# Patient Record
Sex: MALE | Race: WHITE | NOT HISPANIC OR LATINO | Employment: UNEMPLOYED | URBAN - METROPOLITAN AREA
[De-identification: names, ages, dates, MRNs, and addresses within clinical notes are randomized per-mention and may not be internally consistent; named-entity substitution may affect disease eponyms.]

---

## 2018-08-08 ENCOUNTER — APPOINTMENT (EMERGENCY)
Dept: RADIOLOGY | Facility: HOSPITAL | Age: 68
End: 2018-08-08
Payer: COMMERCIAL

## 2018-08-08 ENCOUNTER — HOSPITAL ENCOUNTER (EMERGENCY)
Facility: HOSPITAL | Age: 68
Discharge: HOME | End: 2018-08-08
Attending: EMERGENCY MEDICINE | Admitting: EMERGENCY MEDICINE
Payer: COMMERCIAL

## 2018-08-08 VITALS
HEIGHT: 71 IN | HEART RATE: 50 BPM | RESPIRATION RATE: 18 BRPM | SYSTOLIC BLOOD PRESSURE: 127 MMHG | OXYGEN SATURATION: 98 % | WEIGHT: 171 LBS | DIASTOLIC BLOOD PRESSURE: 77 MMHG | BODY MASS INDEX: 23.94 KG/M2 | TEMPERATURE: 97.8 F

## 2018-08-08 DIAGNOSIS — N20.0 NEPHROLITHIASIS: Primary | ICD-10-CM

## 2018-08-08 LAB
ANION GAP SERPL CALCULATED.3IONS-SCNC: 6 MMOL/L (ref 4–13)
BACTERIA UR QL AUTO: ABNORMAL /HPF
BILIRUB UR QL STRIP: ABNORMAL
BUN SERPL-MCNC: 25 MG/DL (ref 5–25)
CALCIUM SERPL-MCNC: 8.7 MG/DL (ref 8.3–10.1)
CAOX CRY URNS QL MICRO: ABNORMAL /HPF
CHLORIDE SERPL-SCNC: 112 MMOL/L (ref 100–108)
CLARITY UR: ABNORMAL
CO2 SERPL-SCNC: 24 MMOL/L (ref 21–32)
COLOR UR: YELLOW
CREAT SERPL-MCNC: 1.01 MG/DL (ref 0.6–1.3)
GFR SERPL CREATININE-BSD FRML MDRD: 77 ML/MIN/1.73SQ M
GLUCOSE SERPL-MCNC: 101 MG/DL (ref 65–140)
GLUCOSE UR STRIP-MCNC: NEGATIVE MG/DL
HGB UR QL STRIP.AUTO: ABNORMAL
HOLD SPECIMEN: NORMAL
HOLD SPECIMEN: NORMAL
KETONES UR STRIP-MCNC: NEGATIVE MG/DL
LEUKOCYTE ESTERASE UR QL STRIP: NEGATIVE
MUCOUS THREADS UR QL AUTO: ABNORMAL
NITRITE UR QL STRIP: NEGATIVE
NON-SQ EPI CELLS URNS QL MICRO: ABNORMAL /HPF
PH UR STRIP.AUTO: 5.5 [PH] (ref 4.5–8)
POTASSIUM SERPL-SCNC: 4.1 MMOL/L (ref 3.5–5.3)
PROT UR STRIP-MCNC: ABNORMAL MG/DL
RBC #/AREA URNS AUTO: ABNORMAL /HPF
SODIUM SERPL-SCNC: 142 MMOL/L (ref 136–145)
SP GR UR STRIP.AUTO: >=1.03 (ref 1–1.03)
UROBILINOGEN UR QL STRIP.AUTO: 0.2 E.U./DL
WBC #/AREA URNS AUTO: ABNORMAL /HPF

## 2018-08-08 PROCEDURE — 80048 BASIC METABOLIC PNL TOTAL CA: CPT | Performed by: EMERGENCY MEDICINE

## 2018-08-08 PROCEDURE — 81001 URINALYSIS AUTO W/SCOPE: CPT

## 2018-08-08 PROCEDURE — 99284 EMERGENCY DEPT VISIT MOD MDM: CPT

## 2018-08-08 PROCEDURE — 36415 COLL VENOUS BLD VENIPUNCTURE: CPT | Performed by: EMERGENCY MEDICINE

## 2018-08-08 PROCEDURE — 74176 CT ABD & PELVIS W/O CONTRAST: CPT

## 2018-08-08 RX ORDER — MORPHINE SULFATE 15 MG/1
7.5 TABLET ORAL EVERY 4 HOURS PRN
Qty: 2 TABLET | Refills: 0 | Status: SHIPPED | OUTPATIENT
Start: 2018-08-08

## 2018-08-08 RX ORDER — LISINOPRIL 40 MG/1
40 TABLET ORAL DAILY
COMMUNITY
Start: 2017-12-15

## 2018-08-08 RX ORDER — IBUPROFEN 400 MG/1
400 TABLET ORAL ONCE
Status: COMPLETED | OUTPATIENT
Start: 2018-08-08 | End: 2018-08-08

## 2018-08-08 RX ADMIN — IBUPROFEN 400 MG: 400 TABLET ORAL at 10:17

## 2018-08-08 NOTE — DISCHARGE INSTRUCTIONS
Kidney Stones   WHAT YOU NEED TO KNOW:   What is a kidney stone? Kidney stones form in the urinary system when the water and waste in your urine are out of balance  When this happens, certain types of waste crystals separate from the urine  The crystals build up and form kidney stones  Kidney stones can be made of uric acid, calcium, phosphate, or oxalate crystals  You may have 1 or more kidney stones  What increases my risk for kidney stones? · You do not drink enough liquids (especially water) each day  · You have urinary tract infections often  · You follow a certain type of diet  For example, people who eat a diet high in meat or salt may be at higher risk for kidney stones  People who eat foods high in oxalate may also be at higher risk  Foods that are high in oxalate include nuts, chocolate, coffee, and green leafy vegetables  · You take certain medicines such as diuretics, steroids, and antacids  · A family member has had kidney stones  · You were born with a kidney or bowel disorder, or you have other medical problems such as gout  What are the signs and symptoms of kidney stones? · Pain in the middle of your back that moves across to your side or that may spread to your groin    · Nausea and vomiting    · Urge to urinate often, burning feeling when you urinate, or pink or red urine    · Tenderness in your lower back, side, or stomach  How are kidney stones diagnosed? Your healthcare provider will ask about your health, diet, and lifestyle  He may refer you to a urologist  Peri Back may need tests to find out what type of kidney stones you have  Tests can show the size of your kidney stones and where they are in your urinary system  You may have one or more of the following:  · Urine tests  may show if you have blood in your urine  They may also show high amounts of the substances that form kidney stones, such as uric acid  · Blood tests  show how well your kidneys are working   They may also be used to check the levels of calcium or uric acid in your blood  · A noncontrast helical CT scan  is a type of x-ray that uses a computer to take pictures of your kidneys  Healthcare providers use the pictures to check for kidney stones or other problems  · X-rays  of your kidneys, bladder, and ureters may be done  You may be given a dye before the pictures are taken to help healthcare providers see the pictures better  You may need to have more than one x-ray  Tell the healthcare provider if you have ever had an allergic reaction to contrast dye  · An abdominal ultrasound  uses sound waves to show pictures of your abdomen on a monitor  How are kidney stones treated? · NSAIDs , such as ibuprofen, help decrease swelling, pain, and fever  This medicine is available with or without a doctor's order  NSAIDs can cause stomach bleeding or kidney problems in certain people  If you take blood thinner medicine, always ask your healthcare provider if NSAIDs are safe for you  Always read the medicine label and follow directions  · Prescription medicine  may be given  Ask how to take this medicine safely  · Medicines  to balance your electrolytes may be needed  · A procedure or surgery  to remove the kidney stones may be needed if they do not pass on their own  Your treatment will depend on the size and location of your kidney stones  How can I manage my symptoms? · Drink plenty of liquids  Your healthcare provider may tell you to drink at least 8 to 12 (eight-ounce) cups of liquids each day  This helps flush out the kidney stones when you urinate  Water is the best liquid to drink  · Strain your urine every time you go to the bathroom  Urinate through a strainer or a piece of thin cloth to catch the stones  Take the stones to your healthcare provider so they can be sent to the lab for tests  This will help your healthcare providers plan the best treatment for you             · Eat a variety of healthy foods  Healthy foods include fruits, vegetables, whole-grain breads, low-fat dairy products, beans, and fish  You may need to limit how much sodium (salt) or protein you eat  Ask for information about the best foods for you  · Exercise regularly  Your stones may pass more easily if you stay active  Ask about the best activities for you  When should I seek immediate care? · You have vomiting that is not relieved by medicine  When should I contact my healthcare provider? · You have a fever  · You have trouble passing urine  · You see blood in your urine  · You have severe pain  · You have any questions or concerns about your condition or care  CARE AGREEMENT:   You have the right to help plan your care  Learn about your health condition and how it may be treated  Discuss treatment options with your caregivers to decide what care you want to receive  You always have the right to refuse treatment  The above information is an  only  It is not intended as medical advice for individual conditions or treatments  Talk to your doctor, nurse or pharmacist before following any medical regimen to see if it is safe and effective for you  © 2017 [employer] Information is for End User's use only and may not be sold, redistributed or otherwise used for commercial purposes  All illustrations and images included in CareNotes® are the copyrighted property of A D A M , Inc  or Daren Lucero

## 2018-08-08 NOTE — ED PROVIDER NOTES
History  Chief Complaint   Patient presents with    Abdominal Pain     Right lower abdominal pain for last 2 days as well as difficulty urinating during that time  Patient reports vomiting x1 with nausea this morning     Pt is 71yo M presenting for abdominal pain  He has a history of HTN, BPH, and nephrolithiasis  He reports the pain started about 2-3 days in his right lower quadrant and has been increasing in severity since then  It is an intermittent pain without radiation and no relieving or inciting factors  He has not tried taken any medications for pain at home  Today he had several episodes of vomiting, no blood in vomit  He also complains of feelings of not emptying his bladder completely during this timeframe  He does not admit to hematuria, frequency, urgency, or burning with urination  He denies headache, chest pain, shortness of breath, bulges in his groin area  He denies abdominal pain at this moment, saying disappeared at some point between driving to the ED and being put in a room  Prior to Admission Medications   Prescriptions Last Dose Informant Patient Reported? Taking?   lisinopril (ZESTRIL) 40 mg tablet 8/8/2018 at 630  Yes Yes   Sig: Take 40 mg by mouth daily      Facility-Administered Medications: None       Past Medical History:   Diagnosis Date    Hypertension     Spontaneous pneumothorax     When 22years old       Past Surgical History:   Procedure Laterality Date    LAMINECTOMY         History reviewed  No pertinent family history  I have reviewed and agree with the history as documented  Social History   Substance Use Topics    Smoking status: Former Smoker    Smokeless tobacco: Never Used    Alcohol use Yes        Review of Systems   Constitutional: Negative for appetite change, chills, fatigue and fever  HENT: Negative for congestion, ear pain, rhinorrhea and sore throat  Eyes: Negative for photophobia, discharge and visual disturbance     Respiratory: Negative for cough, chest tightness, shortness of breath and wheezing  Cardiovascular: Negative for chest pain and leg swelling  Gastrointestinal: Positive for abdominal pain, nausea and vomiting  Negative for abdominal distention, blood in stool, constipation and diarrhea  Reported pain in RLQ, does not radiate; pain is disappeared now  Genitourinary: Positive for dysuria  Negative for flank pain, frequency, hematuria and urgency  Musculoskeletal: Negative for arthralgias, back pain, myalgias and neck pain  Skin: Negative for color change, pallor and rash  Neurological: Negative for dizziness, seizures, syncope, weakness, light-headedness, numbness and headaches  Physical Exam  ED Triage Vitals [08/08/18 0854]   Temperature Pulse Respirations Blood Pressure SpO2   97 8 °F (36 6 °C) (!) 48 18 149/81 98 %      Temp Source Heart Rate Source Patient Position - Orthostatic VS BP Location FiO2 (%)   Oral Monitor Sitting Left arm --      Pain Score       4           Orthostatic Vital Signs  Vitals:    08/08/18 0854 08/08/18 1046   BP: 149/81 127/77   Pulse: (!) 48 (!) 50   Patient Position - Orthostatic VS: Sitting Sitting       Physical Exam   Constitutional: He appears well-developed and well-nourished  No distress  HENT:   Head: Normocephalic and atraumatic  Eyes: Conjunctivae and EOM are normal  Right eye exhibits no discharge  Left eye exhibits no discharge  Neck: Normal range of motion  Neck supple  Cardiovascular: Normal rate, regular rhythm, normal heart sounds and intact distal pulses  No murmur heard  Pulmonary/Chest: Effort normal and breath sounds normal  No stridor  No respiratory distress  He has no wheezes  He has no rales  He exhibits no tenderness  Abdominal: Soft  He exhibits no distension  There is no tenderness  There is no rebound and no guarding  No hernia  No pain on physical exam    Musculoskeletal: Normal range of motion   He exhibits no edema, tenderness or deformity  Neurological: He is alert  No cranial nerve deficit or sensory deficit  He exhibits normal muscle tone  Coordination normal    Skin: Skin is warm  No rash noted  He is not diaphoretic  No erythema  No pallor  ED Medications  Medications   ibuprofen (MOTRIN) tablet 400 mg (400 mg Oral Given 8/8/18 1017)       Diagnostic Studies  Results Reviewed     Procedure Component Value Units Date/Time    [place] draw [[id number]] Collected:  08/08/18 0947    Lab Status:  Final result Specimen:  Blood Updated:  08/08/18 1101    Narrative: The following orders were created for panel order [place] draw  Procedure                               Abnormality         Status                     ---------                               -----------         ------                     Gaylin Kenna / Black tube on YRWT[[id number]]                        Final result               Lavender Top 3 ml on SHXW[[id number]]                         Final result                 Please view results for these tests on the individual orders  Basic metabolic panel [[id number]]  (Abnormal) Collected:  08/08/18 0947    Lab Status:  Final result Specimen:  Blood from Arm, Right Updated:  08/08/18 1011     Sodium 142 mmol/L      Potassium 4 1 mmol/L      Chloride 112 (H) mmol/L      CO2 24 mmol/L      Anion Gap 6 mmol/L      BUN 25 mg/dL      Creatinine 1 01 mg/dL      Glucose 101 mg/dL      Calcium 8 7 mg/dL      eGFR 77 ml/min/1 73sq m     Narrative:         National Kidney Disease Education Program recommendations are as follows:  GFR calculation is accurate only with a steady state creatinine  Chronic Kidney disease less than 60 ml/min/1 73 sq  meters  Kidney failure less than 15 ml/min/1 73 sq  meters      Urine Microscopic [[id number]]  (Abnormal) Collected:  08/08/18 0918    Lab Status:  Final result Specimen:  Urine from Urine, Clean Catch Updated:  08/08/18 0952     RBC, UA Innumerable (A) /hpf      WBC, UA None Seen /hpf Epithelial Cells None Seen /hpf      Bacteria, UA None Seen /hpf      Ca Oxalate [place], UA Occasional (A) /hpf      MUCOUS THREADS Moderate (A)    ED Urine Macroscopic [[id number]]  (Abnormal) Collected:  08/08/18 0918    Lab Status:  Final result Specimen:  Urine Updated:  08/08/18 0906     Color, UA Yellow     Clarity, UA Turbid     pH, UA 5 5     Leukocytes, UA Negative     Nitrite, UA Negative     Protein, UA 30 (1+) (A) mg/dl      Glucose, UA Negative mg/dl      Ketones, UA Negative mg/dl      Urobilinogen, UA 0 2 E U /dl      Bilirubin, UA Interference- unable to analyze (A)     Blood, UA Large (A)     Specific Gravity, UA >=1 030    Narrative:       CLINITEK RESULT                 CT renal stone study abdomen pelvis without contrast   Final Result by Virgie Francois MD (08/08 1022)      Right ureterovesical junction calculus, 4 mm in size protruding into the lumen of the urinary bladder but resulting in moderate to severe right-sided hydroureteronephrosis  Prostatomegaly  Workstation performed: [id number]               Procedures  Procedures      Phone Consults  ED Phone Contact    ED Course  ED Course as of Aug 08 1935   Wed Aug 08, 2018   1036 POCUS shows hydronephrosis, no stone identified in bladder  Identification of Seniors at Risk      Most Recent Value   (ISAR) Identification of Seniors at Risk   Before the illness or injury that brought you to the Emergency, did you need someone to help you on a regular basis? 0 Filed at: 08/08/2018 0857   In the last 24 hours, have you needed more help than usual?  0 Filed at: 08/08/2018 7229   Have you been hospitalized for one or more nights during the past 6 months? 0 Filed at: 08/08/2018 0857   In general, do you see well?  0 Filed at: 08/08/2018 0857   In general, do you have serious problems with your memory? 0 Filed at: 08/08/2018 0624   Do you take more than three different medications every day?   1 Filed at: 08/08/2018 0857   ISAR Score  1 Filed at: 08/08/2018 0857                          [facility]  Number of Diagnoses or Management Options  Nephrolithiasis:   Diagnosis management comments: CT revealed 3-4mm stone at UVJ with hydronephrosis  It is felt patient can be discharged home and follow-up with his urologist  He was given a prescription for 2 morphine pills in case of increased severity of pain, but was told to use NSAIDs for pain control  CritCare Time    Disposition  Final diagnoses:   Nephrolithiasis     Time reflects when diagnosis was documented in both MDM as applicable and the Disposition within this note     Time User Action Codes Description Comment    8/8/2018 10:38 AM Handy Gonzalez Add [N20 0] Nephrolithiasis       ED Disposition     ED Disposition Condition Comment    Discharge  [facility] discharge to home/self care  Condition at discharge: Good        Follow-up Information     Follow up With Specialties Details Why Contact Info    Cory Gaytan MD Internal Medicine In 1 week  [address] [facility] [address] [place] [address] [facility] In 1 week  [address] [place] [id number] [phone]          Discharge Medication List as of 8/8/2018 10:47 AM      CONTINUE these medications which have NOT CHANGED    Details   lisinopril (ZESTRIL) 40 mg tablet Take 40 mg by mouth daily, Starting Fri 12/15/2017, Historical Med           No discharge procedures on file  ED Provider  Attending physically available and evaluated [facility]  I managed the patient along with the ED Attending      Electronically Signed by         Alex Joaquin DO  08/08/18 4346

## 2018-08-08 NOTE — ED ATTENDING ATTESTATION
Catracho Pa DO, saw and evaluated the patient  I have discussed the patient with the resident/non-physician practitioner and agree with the resident's/non-physician practitioner's findings, Plan of Care, and MDM as documented in the resident's/non-physician practitioner's note, except where noted  All available labs and Radiology studies were reviewed  At this point I agree with the current assessment done in the Emergency Department  I have conducted an independent evaluation of this patient a history and physical is as follows:    79 yo male presents for evaluation of 2d hx of RLQ abd pain  Started acutely while not doing anything in particular  Progressive worse since onset  At worst pain rated 7-8/10, non radiating  Associated with a couple episodes of NBNB emesis  No a/e factors  Denies testicular pain, bowel sxs  He does note a sensation of incomplete voiding but PVR here is 11mL  Currently states pain 2/10  Hx of HTN, BPH    abd sntnd no r/g bs+ no obvious hernia noted in RLQ  Imp: RLQ pain, likely stone  Plan: UA, BMP, CT stone protocol        Critical Care Time  CritCare Time    Procedures

## 2022-06-02 LAB — HBA1C MFR BLD HPLC: 5.7 %

## 2022-11-30 ENCOUNTER — OFFICE VISIT (OUTPATIENT)
Dept: INTERNAL MEDICINE CLINIC | Facility: CLINIC | Age: 72
End: 2022-11-30

## 2022-11-30 VITALS
RESPIRATION RATE: 16 BRPM | SYSTOLIC BLOOD PRESSURE: 124 MMHG | WEIGHT: 175 LBS | HEART RATE: 60 BPM | HEIGHT: 68 IN | OXYGEN SATURATION: 96 % | DIASTOLIC BLOOD PRESSURE: 70 MMHG | BODY MASS INDEX: 26.52 KG/M2

## 2022-11-30 DIAGNOSIS — N40.0 BENIGN PROSTATIC HYPERPLASIA WITHOUT LOWER URINARY TRACT SYMPTOMS: ICD-10-CM

## 2022-11-30 DIAGNOSIS — M85.80 OSTEOPENIA, UNSPECIFIED LOCATION: ICD-10-CM

## 2022-11-30 DIAGNOSIS — Z11.59 NEED FOR HEPATITIS C SCREENING TEST: Primary | ICD-10-CM

## 2022-11-30 DIAGNOSIS — I10 PRIMARY HYPERTENSION: ICD-10-CM

## 2022-11-30 DIAGNOSIS — H61.23 BILATERAL IMPACTED CERUMEN: ICD-10-CM

## 2022-11-30 DIAGNOSIS — H91.93 DECREASED HEARING OF BOTH EARS: ICD-10-CM

## 2022-11-30 DIAGNOSIS — L98.9 SKIN LESION: ICD-10-CM

## 2022-11-30 DIAGNOSIS — I70.0 ATHEROSCLEROSIS OF ABDOMINAL AORTA (HCC): ICD-10-CM

## 2022-11-30 PROBLEM — R20.0 NUMBNESS OF LEFT FOOT: Status: ACTIVE | Noted: 2022-11-30

## 2022-11-30 RX ORDER — LOSARTAN POTASSIUM 100 MG/1
100 TABLET ORAL DAILY
COMMUNITY
Start: 2022-11-21

## 2022-11-30 NOTE — PATIENT INSTRUCTIONS
Obtain records of previous cologuard, bone density scan, blood work results, shingles vaccine record, previous image report of AAA screen (abdominal aortic aneurysm)

## 2022-11-30 NOTE — PROGRESS NOTES
Name: Brendon Villafuerte      : 1950      MRN: [id number]  Encounter Provider: Claude Sierra MD  Encounter Date: 2022   Encounter department: [facility]    Assessment & Plan     1  Need for hepatitis C screening test    2  Primary hypertension  Assessment & Plan:  Well controlled  Continue losartan      3  Skin lesion  Assessment & Plan:  Refer to derm    Orders:  -     Ambulatory Referral to Dermatology; Future    4  Benign prostatic hyperplasia without lower urinary tract symptoms  Assessment & Plan:  S/p urolift implants 2021  F/u with urology at [facility]      5  Decreased hearing of both ears  Assessment & Plan:  Clear cerumen  Refer to audiology    Orders:  -     Ambulatory Referral to Audiology; Future    6  Atherosclerosis of abdominal aorta (HCC)  Assessment & Plan:  Pt reports had image of chest years ago that showed atherosclerosis  Assume is AAA screen, no records available  Ask pt to send us record to review      7  Bilateral impacted cerumen  Assessment & Plan:  Use debrox  Return as needed for ear flush    Orders:  -     carbamide peroxide (DEBROX) 6 5 % otic solution; Administer 5 drops into both ears 2 (two) times a day    8  Osteopenia, unspecified location  Assessment & Plan:  Pt reports he had bone scan years ago  Ask pt to send records to me for review           Subjective      HPI   Establish care    Skin lesion  hearing decline  Previous pcp in [facility] ophthalmology, urology    Hx HTN, BPH, osteopenia  Moved from [place] about 7y go  Lives with wife and 4yo grandkid  Has a son in [facility] and a daughter in [place]  Review of Systems   Constitutional: Negative for chills, fatigue and fever  HENT: Negative for congestion, nosebleeds, postnasal drip, sneezing, sore throat and trouble swallowing  Eyes: Negative for pain  Respiratory: Negative for cough, chest tightness, shortness of breath and wheezing      Cardiovascular: Negative for chest pain, palpitations and leg swelling  Gastrointestinal: Negative for abdominal pain, constipation, diarrhea, nausea and vomiting  Endocrine: Negative for cold intolerance, heat intolerance, polydipsia and polyuria  Genitourinary: Negative for difficulty urinating, dysuria, flank pain and hematuria  Musculoskeletal: Negative for arthralgias and myalgias  Skin: Negative for rash  Neurological: Negative for dizziness, tremors, weakness and headaches  Hematological: Does not bruise/bleed easily  Psychiatric/Behavioral: Negative for confusion and dysphoric mood  The patient is not nervous/anxious  Current Outpatient Medications on File Prior to Visit   Medication Sig   • losartan (COZAAR) 100 MG tablet Take 100 mg by mouth daily   • [DISCONTINUED] lisinopril (ZESTRIL) 40 mg tablet Take 40 mg by mouth daily (Patient not taking: Reported on 11/30/2022)   • [DISCONTINUED] morphine (MSIR) 15 mg tablet Take 0 5 tablets (7 5 mg total) by mouth every 4 (four) hours as needed for severe pain for up to 4 doses Max Daily Amount: 45 mg (Patient not taking: Reported on 11/30/2022)       Objective     /70   Pulse 60   Resp 16   Ht 5' 8" (1 727 m)   Wt 79 4 kg (175 lb)   SpO2 96%   BMI 26 61 kg/m²     Physical Exam  Constitutional:       General: He is not in acute distress  Appearance: He is well-developed and well-nourished  HENT:      Head: Normocephalic and atraumatic  Right Ear: External ear normal       Left Ear: External ear normal    Eyes:      General: No scleral icterus  Conjunctiva/sclera: Conjunctivae normal    Neck:      Thyroid: No thyromegaly  Trachea: No tracheal deviation  Cardiovascular:      Rate and Rhythm: Normal rate and regular rhythm  Heart sounds: Normal heart sounds  Pulmonary:      Effort: Pulmonary effort is normal  No respiratory distress  Breath sounds: Normal breath sounds  No wheezing or rales  Abdominal:      Tenderness:  There is no abdominal tenderness  There is no guarding or rebound  Musculoskeletal:         General: No edema  Cervical back: Normal range of motion and neck supple  Lymphadenopathy:      Cervical: No cervical adenopathy  Neurological:      Mental Status: He is alert and oriented to person, place, and time  Psychiatric:         Mood and Affect: Mood and affect normal          Behavior: Behavior normal          Thought Content:  Thought content normal          Judgment: Judgment normal        Rah Barreto MD

## 2022-11-30 NOTE — ASSESSMENT & PLAN NOTE
Pt reports had image of chest years ago that showed atherosclerosis  Assume is AAA screen, no records available   Ask pt to send us record to review

## 2022-12-14 ENCOUNTER — TELEPHONE (OUTPATIENT)
Dept: INTERNAL MEDICINE CLINIC | Facility: CLINIC | Age: 72
End: 2022-12-14

## 2022-12-29 ENCOUNTER — OFFICE VISIT (OUTPATIENT)
Dept: AUDIOLOGY | Facility: CLINIC | Age: 72
End: 2022-12-29

## 2022-12-29 DIAGNOSIS — R94.128 ABNORMAL TYMPANOGRAM: ICD-10-CM

## 2022-12-29 DIAGNOSIS — H90.3 SENSORINEURAL HEARING LOSS (SNHL), BILATERAL: Primary | ICD-10-CM

## 2022-12-29 NOTE — PROGRESS NOTES
ADULT HEARING EVALUATION - [place] [address] AUDIOLOGY      Patient Name: Treasure Atwood   MRN:  [id number]   :  1950   Age: 71 y o  Gender: male   DOS: 2022     HISTORY:     Treasure Atwood, a 71 y o  male, was seen on 2022 at the referral of Dr Lindsey López for an audiometric evaluation  Mr Kari Rai was unaccompanied to today's visit  Mr Kari Rai is a new patient to our practice  Today, Mr Opal Coleman primary complaint(s) was difficulty hearing  Onset of symptoms reportedly began gradually over time  He noted that he is missing phone calls and that others are telling him to get his hearing tested  Mr Kari Rai denied otalgia, otorrhea, aural fullness, tinnitus and dizziness  History of otitis was negative  Mr Kari Rai has no history of ear surgeries  Family history of hearing loss was negative  History of noise exposure is negative  Other documented medical history states that Mr Mckenzie  has a past medical history of Hypertension and Spontaneous pneumothorax  Mr Kari Rai has not had his hearing tested previously  RESULTS:    Otoscopic Evaluation:   Right Ear: Non-occluding cerumen, TM view obscured   Left Ear: Non-occluding cerumen, TM view obscured    Tympanometry:   Right Ear: Type B; no measurable middle ear pressure or static compliance, consistent with middle ear pathology  Left Ear: Type C; significant negative middle ear pressure in the presence of normal static compliance, consistent with Eustachian tube dysfunction or middle ear pathology       Audiometry:  Conventional pure tone audiometry from 250 - 8000 Hz  obtained with good reliability and revealed the following:     Right Ear: normal to severe sensorineural hearing loss (SNHL)   Left Ear: normal to profound sensorineural hearing loss (SNHL)     Speech Audiometry:    Speech Reception (SRT)   Right Ear: 20 dB HL   Left Ear: 25 dB HL    Word Recognition Scores (WRS):  Right Ear: excellent (100 % correct)     Left Ear: excellent (100 % correct)   Stimuli: [id number]    IMPRESSIONS:  Bilateral SNHL, normal to severe AD, normal to profound AS  The results of today's findings were reviewed with Mr Camella Holstein and his hearing thresholds were explained at length  Treatment options, including amplification and communication strategies, were discussed as appropriate  Mr Camella Holstein had several questions about OTC, hearing aid consumerism, market share, models  Approximately 25 minutes were spent addressing his questions  At this point Mr Mckenzie is not ready to proceed with hearing aids, and would like to research this further  He is welcome to follow-up with us at any time once he is ready  Mr Camella Holstein voiced understanding of his test results and had no further questions  RECOMMENDATIONS:    1 ) Follow-up with referring provider  2 ) ENT referral recommended for cerumen removal and further workup for abnormal tympanograms  3 ) Based on today's findings and patient symptoms, Mr Camella Holstein is a candidate for a trial with amplification  A hearing aid evaluation to further discuss Mr Hoangs lifestyle, communication needs, and develop a treatment plan for their hearing loss is recommended  *see attached audiogram*    It was a pleasure working with Mr Camella Holstein today  Thank you for referring this patient  Enoch Jacinto    Clinical Audiologist    [address] [address] [id number]

## 2023-01-03 DIAGNOSIS — R94.128 ABNORMAL TYMPANOGRAM: Primary | ICD-10-CM

## 2023-01-29 PROBLEM — H61.23 BILATERAL IMPACTED CERUMEN: Status: RESOLVED | Noted: 2022-11-30 | Resolved: 2023-01-29

## 2023-02-22 ENCOUNTER — OFFICE VISIT (OUTPATIENT)
Dept: INTERNAL MEDICINE CLINIC | Facility: CLINIC | Age: 73
End: 2023-02-22

## 2023-02-22 VITALS
HEIGHT: 68 IN | DIASTOLIC BLOOD PRESSURE: 70 MMHG | SYSTOLIC BLOOD PRESSURE: 110 MMHG | OXYGEN SATURATION: 98 % | HEART RATE: 64 BPM | WEIGHT: 179.2 LBS | BODY MASS INDEX: 27.16 KG/M2

## 2023-02-22 DIAGNOSIS — S99.921A INJURY OF RIGHT FOOT, INITIAL ENCOUNTER: Primary | ICD-10-CM

## 2023-02-22 NOTE — PROGRESS NOTES
Name: Krish Carter      : 1950      MRN: [id number]  Encounter Provider: Gabino Bower MD  Encounter Date: 2023   Encounter department: [facility]    Assessment & Plan     1   Injury of right foot, initial encounter  Assessment & Plan:  Redness on top of the foot where it was hit by the log  Tenderness at the bottom around first MTP  Will get xray to evaluate for fracture    Orders:  -     XR foot 3+ vw right; Future; Expected date: 2023         Subjective      HPI   Had a log fell over right foot yesterday  A little pain, redness   Able to bear weight  Review of Systems    Current Outpatient Medications on File Prior to Visit   Medication Sig   • losartan (COZAAR) 100 MG tablet Take 100 mg by mouth daily   • carbamide peroxide (DEBROX) 6 5 % otic solution Administer 5 drops into both ears 2 (two) times a day (Patient not taking: Reported on 2023)       Objective     /70 (BP Location: Left arm, Patient Position: Sitting, Cuff Size: Large)   Pulse 64   Ht 5' 8" (1 727 m)   Wt 81 3 kg (179 lb 3 2 oz)   SpO2 98%   BMI 27 25 kg/m²     Physical Exam  Gabino Bower MD

## 2023-02-22 NOTE — ASSESSMENT & PLAN NOTE
Redness on top of the foot where it was hit by the log  Tenderness at the bottom around first MTP  Will get xray to evaluate for fracture

## 2023-02-23 ENCOUNTER — APPOINTMENT (OUTPATIENT)
Dept: RADIOLOGY | Facility: CLINIC | Age: 73
End: 2023-02-23

## 2023-02-23 DIAGNOSIS — S99.921A INJURY OF RIGHT FOOT, INITIAL ENCOUNTER: ICD-10-CM

## 2023-03-15 ENCOUNTER — APPOINTMENT (OUTPATIENT)
Dept: LAB | Facility: CLINIC | Age: 73
End: 2023-03-15

## 2023-03-15 ENCOUNTER — TRANSCRIBE ORDERS (OUTPATIENT)
Dept: LAB | Facility: CLINIC | Age: 73
End: 2023-03-15

## 2023-03-15 ENCOUNTER — OFFICE VISIT (OUTPATIENT)
Dept: AUDIOLOGY | Facility: CLINIC | Age: 73
End: 2023-03-15

## 2023-03-15 DIAGNOSIS — N13.8 NODULAR PROSTATE WITH URINARY OBSTRUCTION: ICD-10-CM

## 2023-03-15 DIAGNOSIS — H90.3 SENSORINEURAL HEARING LOSS (SNHL), BILATERAL: Primary | ICD-10-CM

## 2023-03-15 DIAGNOSIS — N40.3 NODULAR PROSTATE WITH URINARY OBSTRUCTION: ICD-10-CM

## 2023-03-15 NOTE — PROGRESS NOTES
HEARING AID EVALUTION [facility] AUDIOLOGY    Patient Name: Cayla Carpio   MRN:  [id number]   :  1950   Age: 71 y o  Gender: male   DOS: 3/15/2023     Cayla Carpio was seen today for a hearing aid evaluation following his audiometric testing performed on 1950  Mr Rosa Maria Michel was unaccompanied to today's visit  Mr Rosa Maria Michel was referred by Dr Daniela Ramírez  Mr Rosa Maria Michel returned today after having done extensive research on hearing aid delivery models, OTC, and payment structuring across sites  The audiometric evaluation on 1950 revealed normal to severe sensorineural hearing loss (SNHL) in the right ear and normal to profound sensorineural hearing loss (SNHL) in the left ear  Word recognition scores were excellent in the right ear and excellent in the left ear  This findings were reviewed with Mr Rosa Maria Michel  All of his questions regarding his hearing status were addressed, and the importance of realistic expectations of hearing loss and amplification were emphasized  Hearing aids are assistive devices and are not designed to restore normal hearing  The difference between peripheral hearing loss and speech understanding (central hearing loss) was explained  While improvement with amplification is possible, there will always be word understanding problems due to the inherent nature of hearing loss  These problems will be greater in background noise than in quiet situations  Mr Rosa Maria Michel was counseled on the importance of self-advocacy, motivation, as well as effective communication strategies that can be used to optimize hearing aid success  These effective communication strategies include:   1 ) Maintaining face-to-face communication, allowing for speechreading of facial expressions, lips, and gestures  2 ) Reducing background noise and distance between communication partners  3 ) Having communication partners reduce their rate of speech when appropriate     4 ) Beginning conversation by getting communication partner's attention and stating the topic, allowing for context clues to help fill in gaps in comprehension  5 ) Asking for rephrasing of aspects of conversation that are missed when needed rather than asking for repetitions  To better determine which communication difficulties they are looking to improve upon, a client-oriented scale of improvement (COSI) questionnaire was completed  Based on these results, Mr Jose Carlos Tomlinson prioritized communication difficulties include:     1 ) Better hearing of his wife and granddaughter  2 ) Reduction of communication breakdowns in general     Strengths and limitations of amplification, including various hearing aid styles, options, and circuitry were discussed at length with Mr Breann Samayoa  Based on the degree of his hearing loss, preferences, and lifestyle needs, a trial with DARYA technology was recommended  Differences between technology and  options were reviewed extensively  [facility] office policies regarding our hearing aid program were reviewed, including the 45 day trial period, non-refundable fees, as well as the  warranties  At this time, Mr Breann Samayoa wished to defer purchasing hearing aids at this time  Mr Breann Samayoa stated that he prefers to do additional research on each of the companies, and will report back with questions in 2 weeks  It was a pleasure working with Mr Breann Samayoa  They were encouraged to contact the clinic with any further questions or concerns in the meantime  Enoch Stanley    Clinical Audiologist    [address] [address] [phone]

## 2023-03-16 LAB
PSA FREE MFR SERPL: 38 %
PSA FREE SERPL-MCNC: 0.19 NG/ML
PSA SERPL-MCNC: 0.5 NG/ML (ref 0–4)

## 2023-05-04 DIAGNOSIS — H53.9 VISION CHANGES: Primary | ICD-10-CM

## 2023-05-22 ENCOUNTER — RA CDI HCC (OUTPATIENT)
Dept: OTHER | Facility: HOSPITAL | Age: 73
End: 2023-05-22

## 2023-05-22 NOTE — PROGRESS NOTES
[address]  coding opportunities       Chart reviewed, no opportunity found: CHART REVIEWED, NO OPPORTUNITY FOUND        Patients Insurance     Medicare Insurance: Medicare

## 2023-05-31 ENCOUNTER — OFFICE VISIT (OUTPATIENT)
Dept: INTERNAL MEDICINE CLINIC | Facility: CLINIC | Age: 73
End: 2023-05-31

## 2023-05-31 VITALS
HEART RATE: 57 BPM | BODY MASS INDEX: 27.1 KG/M2 | OXYGEN SATURATION: 97 % | SYSTOLIC BLOOD PRESSURE: 116 MMHG | WEIGHT: 178.8 LBS | HEIGHT: 68 IN | DIASTOLIC BLOOD PRESSURE: 74 MMHG

## 2023-05-31 DIAGNOSIS — R73.09 ELEVATED GLUCOSE: ICD-10-CM

## 2023-05-31 DIAGNOSIS — I10 PRIMARY HYPERTENSION: ICD-10-CM

## 2023-05-31 DIAGNOSIS — I70.0 ATHEROSCLEROSIS OF ABDOMINAL AORTA (HCC): ICD-10-CM

## 2023-05-31 DIAGNOSIS — Z13.6 ENCOUNTER FOR ABDOMINAL AORTIC ANEURYSM (AAA) SCREENING: Primary | ICD-10-CM

## 2023-05-31 DIAGNOSIS — E55.9 VITAMIN D DEFICIENCY: ICD-10-CM

## 2023-05-31 DIAGNOSIS — E78.5 HYPERLIPIDEMIA, UNSPECIFIED HYPERLIPIDEMIA TYPE: ICD-10-CM

## 2023-05-31 DIAGNOSIS — F51.01 PRIMARY INSOMNIA: ICD-10-CM

## 2023-05-31 NOTE — PROGRESS NOTES
"Name: Taurus Melo      : 1950      MRN: [id number]  Encounter Provider: Frank Nixon MD  Encounter Date: 2023   [facility]    Assessment & Plan     1  Encounter for abdominal aortic aneurysm (AAA) screening  -     US abdominal aorta screening aaa; Future; Expected date: 2023    2  Atherosclerosis of abdominal aorta [facility])  Assessment & Plan: Will obtain image    Orders:  -     CBC and differential; Future  -     Comprehensive metabolic panel; Future  -     Lipid panel; Future  -     Hemoglobin A1C; Future    3  Hyperlipidemia, unspecified hyperlipidemia type  -     CBC and differential; Future  -     Comprehensive metabolic panel; Future  -     Lipid panel; Future  -     Hemoglobin A1C; Future    4  Primary hypertension  Assessment & Plan:  Well controlled  Continue losartan    Orders:  -     CBC and differential; Future  -     Comprehensive metabolic panel; Future  -     Lipid panel; Future  -     Hemoglobin A1C; Future    5  Elevated glucose  -     Hemoglobin A1C; Future    6  Vitamin D deficiency  -     Vitamin D 25 hydroxy; Future    7   Primary insomnia  Assessment & Plan:  Counseling provided on sleep hygiene           Subjective      HPI   Doing well in general   No major complain today  Review of Systems    Current Outpatient Medications on File Prior to Visit   Medication Sig   • carbamide peroxide (DEBROX) 6 5 % otic solution Administer 5 drops into both ears 2 (two) times a day   • losartan (COZAAR) 100 MG tablet Take 100 mg by mouth daily       Objective     /74   Pulse 57   Ht 5' 8\" (1 727 m)   Wt 81 1 kg (178 lb 12 8 oz)   SpO2 97%   BMI 27 19 kg/m²     Physical Exam  Frank Nixon MD  "

## 2023-05-31 NOTE — PATIENT INSTRUCTIONS
"Healthy Sleep Tips  From [facility]  [employer]      Healthy sleep habits can make a big difference in your quality of life  Having healthy sleep habits is often referred to as having good sleep hygiene  Try to keep the following sleep practices on a consistent basis:  Stick to a sleep schedule of the same bedtime and wake up time, even on the weekends  This helps to regulate your body's clock and could help you fall asleep and stay asleep for the night  Practice a relaxing bedtime ritual  A relaxing, routine activity right before bedtime conducted away from bright lights helps separate your sleep time from activities that can cause excitement, stress or anxiety which can make it more difficult to fall asleep, get sound and deep sleep or remain asleep  If you have trouble sleeping, avoid naps, especially in the afternoon  Power napping may help you get through the day, but if you find that you can't fall asleep at bedtime, eliminating even short catnaps may help  Exercise daily  Vigorous exercise is best, but even light exercise is better than no activity  Exercise at any time of day, but not at the expense of your sleep  Evaluate your room  Design your sleep environment to establish the conditions you need for sleep  Your bedroom should be cool - between 60 and 67 degrees  Your bedroom should also be free from any noise that can disturb your sleep  Finally, your bedroom should be free from any light  Check your room for noises or other distractions  This includes a bed partner's sleep disruptions such as snoring  Consider using blackout curtains, eye shades, ear plugs, \"white noise\" machines, humidifiers, fans and other devices  Sleep on a comfortable mattress and pillows  Make sure your mattress is comfortable and supportive  The one you have been using for years may have exceeded its life expectancy - about 9 or 10 years for most good quality mattresses   Have comfortable " pillows and make the room attractive and inviting for sleep but also free of  allergens that might affect you and objects that might cause you to slip or fall if you have to get up during the night  Use bright light to help manage your circadian rhythms  Avoid bright light in the evening and expose yourself to sunlight in the morning  This will keep your circadian rhythms in check  Avoid alcohol, cigarettes, and heavy meals in the evening  Alcohol, cigarettes and caffeine can disrupt sleep  Eating big or spicy meals can cause discomfort from indigestion that can make it hard to sleep  If you can, avoid eating large meals for two to three hours before bedtime  Try a light snack 45 minutes before bed if you’re still hungry  Wind down  Your body needs time to shift into sleep mode, so spend the last hour before bed doing a calming activity such as reading  For some people, using an electronic device such as a laptop can make it hard to fall asleep, because the particular type of light emanating from the screens of these devices is activating to the brain  If you have trouble sleeping,  avoid electronics before bed or in the middle of the night  If you can't sleep, go into another room and do something relaxing until you feel tired  It is best to take work materials, computers and televisions out of the sleeping environment  Use your bed only for sleep and sex to strengthen the association between bed and sleep  If you associate a particular activity or item with anxiety about sleeping, omit it from your bedtime routine  If you’re still having trouble sleeping, don’t hesitate to speak with your doctor or to find a sleep professional  Newton Espinosa may also benefit from recording your sleep in a Sleep Diary to help you better evaluate common patterns or issues you may see with your sleep or sleeping habits  What is sleep hygiene?   Sleep hygiene is a variety of different practices and habits that are necessary to have good nighttime sleep quality and full daytime alertness  Why is it important to practice good sleep hygiene? Obtaining healthy sleep is important for both physical and mental health  It can also improve productivity and overall quality of life  Everyone, from children to older adults, can benefit from practicing good sleep habits  How can I improve my sleep hygiene? One of the most important sleep hygiene practices is to spend an appropriate amount of time asleep in bed, not too little or too excessive  Sleep needs vary across ages and are especially impacted by lifestyle and health  However, there are recommendations that can provide guidance on how much sleep you need generally  Other good sleep hygiene practices include:  Limiting daytime naps to 30 minutes  Napping does not make up for inadequate nighttime sleep  However, a short nap of 20-30 minutes can help to improve mood, alertness and performance  Avoiding stimulants such as caffeine and nicotine close to bedtime  And when it comes to alcohol, moderation is key4  While alcohol is well-known to help you fall asleep faster, too much close to bedtime can disrupt sleep in the second half of the night as the body begins to process the alcohol  Exercising to promote good quality sleep  As little as 10 minutes of aerobic exercise, such as walking or cycling, can drastically improve nighttime sleep quality  For the best night’s sleep, most people should avoid strenuous workouts close to bedtime  However, the effect of intense nighttime exercise on sleep differs from person to person, so find out what works best for you  Steering clear of food that can be disruptive right before sleep  Heavy or rich foods, fatty or fried meals, spicy dishes, citrus fruits, and carbonated drinks can trigger indigestion for some people  When this occurs close to bedtime, it can lead to painful heartburn that disrupts sleep  Ensuring adequate exposure to natural light  "This is particularly important for individuals who may not venture outside frequently  Exposure to sunlight during the day, as well as darkness at night, helps to maintain a healthy sleep-wake cycle  Establishing a regular relaxing bedtime routine  A regular nightly routine helps the body recognize that it is bedtime  This could include taking warm shower or bath, reading a book, or light stretches  When possible, try to avoid emotionally upsetting conversations and activities before attempting to sleep  Making sure that the sleep environment is pleasant  Mattress and pillows should be comfortable  The bedroom should be cool - between 60 and 67 degrees - for optimal sleep  Bright light from lamps, cell phone and TV screens can make it difficult to fall asleep4, so turn those light off or adjust them when possible  Consider using blackout curtains, eye shades, ear plugs, \"white noise\" machines, humidifiers, fans and other devices that can make the bedroom more relaxing  What are signs of poor sleep hygiene? Frequent sleep disturbances and daytime sleepiness are the most telling signs of poor sleep hygiene  In addition, if you're taking too long to fall asleep, you should consider evaluating your sleep routine and revising your bedtime habits  Just a few simple changes can make the difference between a good night’s sleep and night spent tossing and turning      "

## 2023-06-12 ENCOUNTER — HOSPITAL ENCOUNTER (OUTPATIENT)
Dept: ULTRASOUND IMAGING | Facility: HOSPITAL | Age: 73
Discharge: HOME | End: 2023-06-12
Attending: GENERAL ACUTE CARE HOSPITAL
Payer: MEDICARE

## 2023-06-12 ENCOUNTER — APPOINTMENT (OUTPATIENT)
Dept: LAB | Facility: CLINIC | Age: 73
End: 2023-06-12
Payer: MEDICARE

## 2023-06-12 DIAGNOSIS — Z13.6 ENCOUNTER FOR ABDOMINAL AORTIC ANEURYSM (AAA) SCREENING: ICD-10-CM

## 2023-06-12 DIAGNOSIS — E78.5 HYPERLIPIDEMIA, UNSPECIFIED HYPERLIPIDEMIA TYPE: ICD-10-CM

## 2023-06-12 DIAGNOSIS — R73.09 ELEVATED GLUCOSE: ICD-10-CM

## 2023-06-12 DIAGNOSIS — E55.9 VITAMIN D DEFICIENCY: ICD-10-CM

## 2023-06-12 DIAGNOSIS — I10 PRIMARY HYPERTENSION: ICD-10-CM

## 2023-06-12 DIAGNOSIS — I70.0 ATHEROSCLEROSIS OF ABDOMINAL AORTA (HCC): ICD-10-CM

## 2023-06-12 LAB
25(OH)D3 SERPL-MCNC: 23.2 NG/ML (ref 30–100)
ALBUMIN SERPL BCP-MCNC: 3.6 G/DL (ref 3.5–5)
ALP SERPL-CCNC: 53 U/L (ref 34–104)
ALT SERPL W P-5'-P-CCNC: 26 U/L (ref 7–52)
ANION GAP SERPL CALCULATED.3IONS-SCNC: 4 MMOL/L (ref 4–13)
AST SERPL W P-5'-P-CCNC: 20 U/L (ref 13–39)
BASOPHILS # BLD AUTO: 0.02 THOUSANDS/ÂΜL (ref 0–0.1)
BASOPHILS NFR BLD AUTO: 1 % (ref 0–1)
BILIRUB SERPL-MCNC: 0.43 MG/DL (ref 0.2–1)
BUN SERPL-MCNC: 23 MG/DL (ref 5–25)
CALCIUM SERPL-MCNC: 8.5 MG/DL (ref 8.4–10.2)
CHLORIDE SERPL-SCNC: 111 MMOL/L (ref 96–108)
CHOLEST SERPL-MCNC: 141 MG/DL
CO2 SERPL-SCNC: 25 MMOL/L (ref 21–32)
CREAT SERPL-MCNC: 0.91 MG/DL (ref 0.6–1.3)
EOSINOPHIL # BLD AUTO: 0.1 THOUSAND/ÂΜL (ref 0–0.61)
EOSINOPHIL NFR BLD AUTO: 3 % (ref 0–6)
ERYTHROCYTE [DISTWIDTH] IN BLOOD BY AUTOMATED COUNT: 12.4 % (ref 11.6–15.1)
GFR SERPL CREATININE-BSD FRML MDRD: 84 ML/MIN/1.73SQ M
GLUCOSE P FAST SERPL-MCNC: 91 MG/DL (ref 65–99)
HCT VFR BLD AUTO: 39.9 % (ref 36.5–49.3)
HDLC SERPL-MCNC: 33 MG/DL
HGB BLD-MCNC: 13 G/DL (ref 12–17)
IMM GRANULOCYTES # BLD AUTO: 0.01 THOUSAND/UL (ref 0–0.2)
IMM GRANULOCYTES NFR BLD AUTO: 0 % (ref 0–2)
LDLC SERPL CALC-MCNC: 93 MG/DL (ref 0–100)
LYMPHOCYTES # BLD AUTO: 1.57 THOUSANDS/ÂΜL (ref 0.6–4.47)
LYMPHOCYTES NFR BLD AUTO: 40 % (ref 14–44)
MCH RBC QN AUTO: 31.3 PG (ref 26.8–34.3)
MCHC RBC AUTO-ENTMCNC: 32.6 G/DL (ref 31.4–37.4)
MCV RBC AUTO: 96 FL (ref 82–98)
MONOCYTES # BLD AUTO: 0.45 THOUSAND/ÂΜL (ref 0.17–1.22)
MONOCYTES NFR BLD AUTO: 11 % (ref 4–12)
NEUTROPHILS # BLD AUTO: 1.81 THOUSANDS/ÂΜL (ref 1.85–7.62)
NEUTS SEG NFR BLD AUTO: 45 % (ref 43–75)
NONHDLC SERPL-MCNC: 108 MG/DL
NRBC BLD AUTO-RTO: 0 /100 WBCS
PLATELET # BLD AUTO: 196 THOUSANDS/UL (ref 149–390)
PMV BLD AUTO: 9.9 FL (ref 8.9–12.7)
POTASSIUM SERPL-SCNC: 4.2 MMOL/L (ref 3.5–5.3)
PROT SERPL-MCNC: 6.6 G/DL (ref 6.4–8.4)
RBC # BLD AUTO: 4.16 MILLION/UL (ref 3.88–5.62)
SODIUM SERPL-SCNC: 140 MMOL/L (ref 135–147)
TRIGL SERPL-MCNC: 76 MG/DL
WBC # BLD AUTO: 3.96 THOUSAND/UL (ref 4.31–10.16)

## 2023-06-12 PROCEDURE — 76706 US ABDL AORTA SCREEN AAA: CPT

## 2023-06-12 PROCEDURE — 36415 COLL VENOUS BLD VENIPUNCTURE: CPT

## 2023-06-12 PROCEDURE — 85025 COMPLETE CBC W/AUTO DIFF WBC: CPT

## 2023-06-12 PROCEDURE — 83036 HEMOGLOBIN GLYCOSYLATED A1C: CPT

## 2023-06-12 PROCEDURE — 80061 LIPID PANEL: CPT

## 2023-06-12 PROCEDURE — 80053 COMPREHEN METABOLIC PANEL: CPT

## 2023-06-12 PROCEDURE — 82306 VITAMIN D 25 HYDROXY: CPT

## 2023-06-13 LAB
EST. AVERAGE GLUCOSE BLD GHB EST-MCNC: 120 MG/DL
HBA1C MFR BLD: 5.8 %

## 2023-10-12 DIAGNOSIS — R39.9 LOWER URINARY TRACT SYMPTOMS: Primary | ICD-10-CM

## 2023-10-13 ENCOUNTER — TELEPHONE (OUTPATIENT)
Age: 73
End: 2023-10-13

## 2023-10-13 NOTE — TELEPHONE ENCOUNTER
Patient call to ask if he could go to the practice for the urine test. Advise patient he could go to a   [facility] lab for test.

## 2023-10-16 ENCOUNTER — OFFICE VISIT (OUTPATIENT)
Dept: INTERNAL MEDICINE CLINIC | Facility: CLINIC | Age: 73
End: 2023-10-16
Payer: MEDICARE

## 2023-10-16 VITALS
OXYGEN SATURATION: 95 % | BODY MASS INDEX: 26.64 KG/M2 | HEART RATE: 83 BPM | DIASTOLIC BLOOD PRESSURE: 70 MMHG | HEIGHT: 68 IN | SYSTOLIC BLOOD PRESSURE: 110 MMHG | TEMPERATURE: 97.7 F | WEIGHT: 175.8 LBS

## 2023-10-16 DIAGNOSIS — Z23 ENCOUNTER FOR IMMUNIZATION: ICD-10-CM

## 2023-10-16 DIAGNOSIS — R31.9 URINARY TRACT INFECTION WITH HEMATURIA, SITE UNSPECIFIED: Primary | ICD-10-CM

## 2023-10-16 DIAGNOSIS — N39.0 URINARY TRACT INFECTION WITH HEMATURIA, SITE UNSPECIFIED: Primary | ICD-10-CM

## 2023-10-16 LAB
BACTERIA UR QL AUTO: ABNORMAL /HPF
BILIRUB UR QL STRIP: NEGATIVE
CAOX CRY URNS QL MICRO: ABNORMAL /HPF
CLARITY UR: ABNORMAL
COLOR UR: ABNORMAL
GLUCOSE UR STRIP-MCNC: NEGATIVE MG/DL
HGB UR QL STRIP.AUTO: ABNORMAL
KETONES UR STRIP-MCNC: NEGATIVE MG/DL
LEUKOCYTE ESTERASE UR QL STRIP: NEGATIVE
MUCOUS THREADS UR QL AUTO: ABNORMAL
NITRITE UR QL STRIP: NEGATIVE
NON-SQ EPI CELLS URNS QL MICRO: ABNORMAL /HPF
PH UR STRIP.AUTO: 5.5 [PH]
PROT UR STRIP-MCNC: ABNORMAL MG/DL
RBC #/AREA URNS AUTO: ABNORMAL /HPF
SL AMB  POCT GLUCOSE, UA: ABNORMAL
SL AMB LEUKOCYTE ESTERASE,UA: ABNORMAL
SL AMB POCT BILIRUBIN,UA: ABNORMAL
SL AMB POCT BLOOD,UA: ABNORMAL
SL AMB POCT CLARITY,UA: ABNORMAL
SL AMB POCT COLOR,UA: ABNORMAL
SL AMB POCT KETONES,UA: ABNORMAL
SL AMB POCT NITRITE,UA: ABNORMAL
SL AMB POCT PH,UA: 6
SL AMB POCT SPECIFIC GRAVITY,UA: 1.03
SL AMB POCT URINE PROTEIN: ABNORMAL
SL AMB POCT UROBILINOGEN: 3.5
SP GR UR STRIP.AUTO: 1.02 (ref 1–1.03)
UROBILINOGEN UR STRIP-ACNC: <2 MG/DL
WBC #/AREA URNS AUTO: ABNORMAL /HPF

## 2023-10-16 PROCEDURE — 90662 IIV NO PRSV INCREASED AG IM: CPT

## 2023-10-16 PROCEDURE — G0008 ADMIN INFLUENZA VIRUS VAC: HCPCS

## 2023-10-16 PROCEDURE — 81001 URINALYSIS AUTO W/SCOPE: CPT | Performed by: GENERAL ACUTE CARE HOSPITAL

## 2023-10-16 PROCEDURE — 99213 OFFICE O/P EST LOW 20 MIN: CPT | Performed by: GENERAL ACUTE CARE HOSPITAL

## 2023-10-16 PROCEDURE — 81002 URINALYSIS NONAUTO W/O SCOPE: CPT | Performed by: GENERAL ACUTE CARE HOSPITAL

## 2023-10-16 RX ORDER — NITROFURANTOIN 25; 75 MG/1; MG/1
100 CAPSULE ORAL 2 TIMES DAILY
Qty: 10 CAPSULE | Refills: 0 | Status: SHIPPED | OUTPATIENT
Start: 2023-10-16

## 2023-10-16 NOTE — PROGRESS NOTES
Assessment/Plan:   Problem List Items Addressed This Visit       Urinary tract infection with hematuria - Primary     Typical UTI symptoms will treat with macrobid. Kidney function wnl         Relevant Medications    nitrofurantoin (MACROBID) 100 mg capsule    Other Relevant Orders    POCT urine dip (Completed)    UA w Reflex to Microscopic w Reflex to Culture - Clinic Collect        Diagnoses and all orders for this visit:    Urinary tract infection with hematuria, site unspecified  -     POCT urine dip  -     nitrofurantoin (MACROBID) 100 mg capsule; Take 1 capsule (100 mg total) by mouth 2 (two) times a day  -     UA w Reflex to Microscopic w Reflex to Culture - Clinic Collect          Subjective:     Patient ID: Ildefonso Couch is a 68 y.o. male.     HPI  Dysuria, urgency and frequency for a week  Review of Systems      Objective:     Physical Exam

## 2023-11-09 ENCOUNTER — NEW PATIENT (OUTPATIENT)
Dept: URBAN - METROPOLITAN AREA CLINIC 6 | Facility: CLINIC | Age: 73
End: 2023-11-09

## 2023-11-09 DIAGNOSIS — H53.2: ICD-10-CM

## 2023-11-09 LAB
DIPLOPIA-BTEA- ARCNJ: NORMAL
MRI SCAN: NORMAL

## 2023-11-09 PROCEDURE — 92002 INTRM OPH EXAM NEW PATIENT: CPT

## 2023-11-09 ASSESSMENT — VISUAL ACUITY
OS_CC: J1
OS_SC: 20/25-2
OD_CC: J5
OD_SC: 20/25-2

## 2023-11-09 ASSESSMENT — TONOMETRY
OD_IOP_MMHG: 17
OS_IOP_MMHG: 14

## 2023-11-13 NOTE — PROGRESS NOTES
Referring Physician: Selena Salazar MD  A copy of this note was sent to the referring physician. Diagnoses and all orders for this visit:    Gross hematuria  -     CT abdomen pelvis w wo contrast; Future  -     Creatinine, serum; Future    Lower urinary tract symptoms  -     Ambulatory Referral to Urology  -     POCT urine dip    Benign prostatic hyperplasia with lower urinary tract symptoms, symptom details unspecified  -     finasteride (PROSCAR) 5 mg tablet; Take 1 tablet (5 mg total) by mouth daily            Assessment and plan:       1. 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 Persistent gross hematuria since the procedure  - outside cystoscopy (records reviewed) describe hypervascularity within prostatic fossa  - has ultrasound for upper tract imaging, but no axial imaging    3. Recent UTI  -Urine was not submitted for culture    4. Familial history of aggressive prostate cancer  Prostate cancer screening  -PSA 0.5 March 2023    #5 [facility]'s [facility] care  - Hemoglobin A1c 5.8  - BMI 26  -LDL of 93 as noted    It was a pleasure to evaluate this medically complex patient. Primary source of symptomatology is intermittent gross hematuria essentially since the time of his UroLift procedure I have not set institution 2 years ago. Cystoscopy with an outside provider did demonstrate friable appearing blood vessels along the prostatic fossa. He also had a recent presumptive UTI    Discussed a number of options. First and foremost the patient does require a CT urogram to evaluate for any occult upper tract lesions. In terms of hematuria from the prostatic source we discussed the efficacy and adverse effects of finasteride.   Dosing adverse effects reviewed, patient is willing to try the medication on a trial.    Comprehensive plan is as follows:  - Schedule CT urogram  - Patient be contacted with results when complete  - Finasteride prescribed after reviewing the dosing and adverse effects  - Clinical follow-up with her advanced practitioner team in 3 months time or sooner if needed. Arron Zurita MD      Chief Complaint     Consultation for BPH and history of UTI      History of Present Illness     Omayra Diggs is a 68 y.o. Detailed Urologic History     - please refer to HPI    Review of Systems     Review of Systems   Constitutional: Negative for activity change and fatigue. HENT: Negative for congestion. Eyes: Negative for visual disturbance. Respiratory: Negative for shortness of breath and wheezing. Cardiovascular: Negative for chest pain and leg swelling. Gastrointestinal: Negative for abdominal pain. Endocrine: Negative for polyuria. Genitourinary: Negative for dysuria, flank pain, hematuria and urgency. Musculoskeletal: Negative for back pain. Allergic/Immunologic: Negative for immunocompromised state. Neurological: Negative for dizziness and numbness. Psychiatric/Behavioral: Negative for dysphoric mood. All other systems reviewed and are negative.       AUA SYMPTOM SCORE      Flowsheet Row Most Recent Value   AUA SYMPTOM SCORE    How often have you had a sensation of not emptying your bladder completely after you finished urinating? 1 (P)     How often have you had to urinate again less than two hours after you finished urinating? 3 (P)     How often have you found you stopped and started again several times when you urinate? 1 (P)     How often have you found it difficult to postpone urination? 4 (P)     How often have you had a weak urinary stream? 2 (P)     How often have you had to push or strain to begin urination? 1 (P)     How many times did you most typically get up to urinate from the time you went to bed at night until the time you got up in the morning? 2 (P)     Quality of Life: If you were to spend the rest of your life with your urinary condition just the way it is now, how would you feel about that? 3 (P)     AUA SYMPTOM SCORE 14 (P)               Allergies     No Known Allergies    Physical Exam       Physical Exam  Constitutional:       General: He is not in acute distress. Appearance: He is well-developed. HENT:      Head: Normocephalic and atraumatic. Cardiovascular:      Comments: Negative lower extremity edema  Pulmonary:      Effort: Pulmonary effort is normal.      Breath sounds: Normal breath sounds. Abdominal:      Palpations: Abdomen is soft. Musculoskeletal:         General: Normal range of motion. Cervical back: Normal range of motion. Skin:     General: Skin is warm. Neurological:      Mental Status: He is alert and oriented to person, place, and time.    Psychiatric:         Behavior: Behavior normal.           Vital Signs  Vitals:    11/15/23 0932   BP: 120/70   BP Location: Left arm   Patient Position: Sitting   Cuff Size: Standard   Pulse: 57   SpO2: 97%   Weight: 79.8 kg (176 lb)   Height: 5' 8" (1.727 m)         Current Medications       Current Outpatient Medications:     finasteride (PROSCAR) 5 mg tablet, Take 1 tablet (5 mg total) by mouth daily, Disp: 30 tablet, Rfl: 6    losartan (COZAAR) 100 MG tablet, Take 100 mg by mouth daily, Disp: , Rfl:     nitrofurantoin (MACROBID) 100 mg capsule, Take 1 capsule (100 mg total) by mouth 2 (two) times a day, Disp: 10 capsule, Rfl: 0      Active Problems     Patient Active Problem List   Diagnosis    Skin lesion    Decreased hearing of both ears    Primary hypertension    Benign prostatic hyperplasia without lower urinary tract symptoms    Osteopenia    Numbness of right foot    Atherosclerosis of abdominal aorta (HCC)    Injury of right foot    Primary insomnia    Urinary tract infection with hematuria         Past Medical History     Past Medical History:   Diagnosis Date    Hypertension     Spontaneous pneumothorax     When 22years old         Surgical History     Past Surgical History:   Procedure Laterality Date    LAMINECTOMY      VASECTOMY      per patient, [id number]         Family History     Family History   Problem Relation Age of Onset    Prostate cancer Father          Social History     Social History     Social History     Tobacco Use   Smoking Status Former   Smokeless Tobacco Never   Tobacco Comments    2ppd [age], then [age], then quit         Pertinent Lab Values     Lab Results   Component Value Date    CREATININE 0.91 06/12/2023       Lab Results   Component Value Date    PSA 0.5 03/15/2023       @RESULTRCNT(1H])@      Pertinent Imaging       No results found. Portions of the record may have been created with voice recognition software. Occasional wrong word or "sound a like" substitutions may have occurred due to the inherent limitations of voice recognition software. In addition some of the content generated from this outpatient encounter includes information designed for patient education and/or communication back to the referring provider. Read the chart carefully and recognize, using context, where substitutions have occurred.

## 2023-11-15 ENCOUNTER — APPOINTMENT (OUTPATIENT)
Dept: LAB | Facility: CLINIC | Age: 73
End: 2023-11-15
Payer: MEDICARE

## 2023-11-15 ENCOUNTER — OFFICE VISIT (OUTPATIENT)
Dept: UROLOGY | Facility: CLINIC | Age: 73
End: 2023-11-15
Payer: MEDICARE

## 2023-11-15 VITALS
SYSTOLIC BLOOD PRESSURE: 120 MMHG | BODY MASS INDEX: 26.67 KG/M2 | HEART RATE: 57 BPM | HEIGHT: 68 IN | WEIGHT: 176 LBS | DIASTOLIC BLOOD PRESSURE: 70 MMHG | OXYGEN SATURATION: 97 %

## 2023-11-15 DIAGNOSIS — H53.2 DIPLOPIA: ICD-10-CM

## 2023-11-15 DIAGNOSIS — R39.9 LOWER URINARY TRACT SYMPTOMS: ICD-10-CM

## 2023-11-15 DIAGNOSIS — R31.0 GROSS HEMATURIA: ICD-10-CM

## 2023-11-15 DIAGNOSIS — R31.0 GROSS HEMATURIA: Primary | ICD-10-CM

## 2023-11-15 DIAGNOSIS — N40.1 BENIGN PROSTATIC HYPERPLASIA WITH LOWER URINARY TRACT SYMPTOMS, SYMPTOM DETAILS UNSPECIFIED: ICD-10-CM

## 2023-11-15 LAB
ALBUMIN SERPL BCP-MCNC: 4 G/DL (ref 3.5–5)
ALP SERPL-CCNC: 58 U/L (ref 34–104)
ALT SERPL W P-5'-P-CCNC: 26 U/L (ref 7–52)
ANION GAP SERPL CALCULATED.3IONS-SCNC: 4 MMOL/L
AST SERPL W P-5'-P-CCNC: 23 U/L (ref 13–39)
B BURGDOR IGG+IGM SER QL IA: NEGATIVE
BILIRUB SERPL-MCNC: 0.31 MG/DL (ref 0.2–1)
BUN SERPL-MCNC: 24 MG/DL (ref 5–25)
CALCIUM SERPL-MCNC: 9 MG/DL (ref 8.4–10.2)
CHLORIDE SERPL-SCNC: 111 MMOL/L (ref 96–108)
CO2 SERPL-SCNC: 25 MMOL/L (ref 21–32)
CREAT SERPL-MCNC: 0.92 MG/DL (ref 0.6–1.3)
ERYTHROCYTE [SEDIMENTATION RATE] IN BLOOD: 31 MM/HOUR (ref 0–19)
GFR SERPL CREATININE-BSD FRML MDRD: 82 ML/MIN/1.73SQ M
GLUCOSE P FAST SERPL-MCNC: 90 MG/DL (ref 65–99)
POTASSIUM SERPL-SCNC: 4.4 MMOL/L (ref 3.5–5.3)
PROT SERPL-MCNC: 7.1 G/DL (ref 6.4–8.4)
SL AMB  POCT GLUCOSE, UA: NORMAL
SL AMB LEUKOCYTE ESTERASE,UA: NORMAL
SL AMB POCT BILIRUBIN,UA: NORMAL
SL AMB POCT BLOOD,UA: NORMAL
SL AMB POCT CLARITY,UA: CLEAR
SL AMB POCT COLOR,UA: YELLOW
SL AMB POCT KETONES,UA: NORMAL
SL AMB POCT NITRITE,UA: NORMAL
SL AMB POCT PH,UA: 6
SL AMB POCT SPECIFIC GRAVITY,UA: 1030
SL AMB POCT URINE PROTEIN: NORMAL
SL AMB POCT UROBILINOGEN: NORMAL
SODIUM SERPL-SCNC: 140 MMOL/L (ref 135–147)
TSH SERPL DL<=0.05 MIU/L-ACNC: 1.39 UIU/ML (ref 0.45–4.5)

## 2023-11-15 PROCEDURE — 81002 URINALYSIS NONAUTO W/O SCOPE: CPT | Performed by: UROLOGY

## 2023-11-15 PROCEDURE — 84443 ASSAY THYROID STIM HORMONE: CPT

## 2023-11-15 PROCEDURE — 83519 RIA NONANTIBODY: CPT

## 2023-11-15 PROCEDURE — 86255 FLUORESCENT ANTIBODY SCREEN: CPT

## 2023-11-15 PROCEDURE — 86618 LYME DISEASE ANTIBODY: CPT

## 2023-11-15 PROCEDURE — 80053 COMPREHEN METABOLIC PANEL: CPT

## 2023-11-15 PROCEDURE — 36415 COLL VENOUS BLD VENIPUNCTURE: CPT

## 2023-11-15 PROCEDURE — 99204 OFFICE O/P NEW MOD 45 MIN: CPT | Performed by: UROLOGY

## 2023-11-15 PROCEDURE — 85652 RBC SED RATE AUTOMATED: CPT

## 2023-11-15 RX ORDER — FINASTERIDE 5 MG/1
5 TABLET, FILM COATED ORAL DAILY
Qty: 30 TABLET | Refills: 6 | Status: SHIPPED | OUTPATIENT
Start: 2023-11-15

## 2023-11-17 LAB — ACHR BIND AB SER-SCNC: 0.05 NMOL/L (ref 0–0.24)

## 2023-11-21 LAB — ACHR BLOCK AB/ACHR TOTAL SFR SER: 22 % (ref 0–25)

## 2023-11-22 LAB — ACHR MOD AB/ACHR TOTAL SFR SER: 0 % (ref 0–45)

## 2023-12-03 PROBLEM — Z00.00 MEDICARE ANNUAL WELLNESS VISIT, SUBSEQUENT: Status: ACTIVE | Noted: 2023-12-03

## 2023-12-04 ENCOUNTER — OFFICE VISIT (OUTPATIENT)
Dept: INTERNAL MEDICINE CLINIC | Facility: CLINIC | Age: 73
End: 2023-12-04
Payer: MEDICARE

## 2023-12-04 VITALS
HEIGHT: 68 IN | HEART RATE: 54 BPM | WEIGHT: 176.4 LBS | OXYGEN SATURATION: 99 % | DIASTOLIC BLOOD PRESSURE: 70 MMHG | SYSTOLIC BLOOD PRESSURE: 132 MMHG | BODY MASS INDEX: 26.73 KG/M2

## 2023-12-04 DIAGNOSIS — E78.5 HYPERLIPIDEMIA, UNSPECIFIED HYPERLIPIDEMIA TYPE: ICD-10-CM

## 2023-12-04 DIAGNOSIS — Z12.11 COLON CANCER SCREENING: ICD-10-CM

## 2023-12-04 DIAGNOSIS — R73.03 PREDIABETES: ICD-10-CM

## 2023-12-04 DIAGNOSIS — E55.9 VITAMIN D DEFICIENCY: ICD-10-CM

## 2023-12-04 DIAGNOSIS — Z12.5 PROSTATE CANCER SCREENING: ICD-10-CM

## 2023-12-04 DIAGNOSIS — Z00.00 MEDICARE ANNUAL WELLNESS VISIT, SUBSEQUENT: Primary | ICD-10-CM

## 2023-12-04 PROCEDURE — G0438 PPPS, INITIAL VISIT: HCPCS | Performed by: GENERAL ACUTE CARE HOSPITAL

## 2023-12-04 RX ORDER — CHOLECALCIFEROL (VITAMIN D3) 50 MCG
2000 TABLET ORAL DAILY
COMMUNITY

## 2023-12-04 NOTE — PATIENT INSTRUCTIONS
Get labs prior to next visit with me, can be done anytime between Feb and July    Medicare Preventive Visit Patient Instructions  Thank you for completing your Welcome to Medicare Visit or Medicare Annual Wellness Visit today. Your next wellness visit will be due in one year (12/4/2024). The screening/preventive services that you may require over the next 5-10 years are detailed below. Some tests may not apply to you based off risk factors and/or age. Screening tests ordered at today's visit but not completed yet may show as past due. Also, please note that scanned in results may not display below. Preventive Screenings:  Service Recommendations Previous Testing/Comments   Colorectal Cancer Screening  Colonoscopy    Fecal Occult Blood Test (FOBT)/Fecal Immunochemical Test (FIT)  Fecal DNA/Cologuard Test  Flexible Sigmoidoscopy Age: 43-73 years old   Colonoscopy: every 10 years (May be performed more frequently if at higher risk)  OR  FOBT/FIT: every 1 year  OR  Cologuard: every 3 years  OR  Sigmoidoscopy: every 5 years  Screening may be recommended earlier than age 39 if at higher risk for colorectal cancer. Also, an individualized decision between you and your healthcare provider will decide whether screening between the ages of 77-80 would be appropriate.  Colonoscopy: Not on file  FOBT/FIT: Not on file  Cologuard: Not on file  Sigmoidoscopy: Not on file          Prostate Cancer Screening Individualized decision between patient and health care provider in men between ages of 53-66   Medicare will cover every 12 months beginning on the day after your 50th birthday PSA: 0.5 ng/mL           Hepatitis C Screening Once for adults born between 1945 and 1965  More frequently in patients at high risk for Hepatitis C Hep C Antibody: Not on file        Diabetes Screening 1-2 times per year if you're at risk for diabetes or have pre-diabetes Fasting glucose: 90 mg/dL (11/15/2023)  A1C: 5.8 % (6/12/2023)      Cholesterol Screening Once every 5 years if you don't have a lipid disorder. May order more often based on risk factors. Lipid panel: 06/12/2023         Other Preventive Screenings Covered by Medicare:  Abdominal Aortic Aneurysm (AAA) Screening: covered once if your at risk. You're considered to be at risk if you have a family history of AAA or a male between the age of 70-76 who smoking at least 100 cigarettes in your lifetime. Lung Cancer Screening: covers low dose CT scan once per year if you meet all of the following conditions: (1) Age 48-67; (2) No signs or symptoms of lung cancer; (3) Current smoker or have quit smoking within the last 15 years; (4) You have a tobacco smoking history of at least 20 pack years (packs per day x number of years you smoked); (5) You get a written order from a healthcare provider. Glaucoma Screening: covered annually if you're considered high risk: (1) You have diabetes OR (2) Family history of glaucoma OR (3)  aged 48 and older OR (3)  American aged 72 and older  Osteoporosis Screening: covered every 2 years if you meet one of the following conditions: (1) Have a vertebral abnormality; (2) On glucocorticoid therapy for more than 3 months; (3) Have primary hyperparathyroidism; (4) On osteoporosis medications and need to assess response to drug therapy. HIV Screening: covered annually if you're between the age of 14-79. Also covered annually if you are younger than 13 and older than 72 with risk factors for HIV infection. For pregnant patients, it is covered up to 3 times per pregnancy.     Immunizations:  Immunization Recommendations   Influenza Vaccine Annual influenza vaccination during flu season is recommended for all persons aged >= 6 months who do not have contraindications   Pneumococcal Vaccine   * Pneumococcal conjugate vaccine = PCV13 (Prevnar 13), PCV15 (Vaxneuvance), PCV20 (Prevnar 20)  * Pneumococcal polysaccharide vaccine = PPSV23 (Pneumovax) Adults 42-79 yo with certain risk factors or if 69+ yo  If never received any pneumonia vaccine: recommend Prevnar 21 (PCV20)  Give PCV20 if previously received 1 dose of PCV13 or PPSV23   Hepatitis B Vaccine 3 dose series if at intermediate or high risk (ex: diabetes, end stage renal disease, liver disease)   Respiratory syncytial virus (RSV) Vaccine - COVERED BY MEDICARE PART D  * RSVPreF3 (Arexvy) CDC recommends that adults 61years of age and older may receive a single dose of RSV vaccine using shared clinical decision-making (SCDM)   Tetanus (Td) Vaccine - COST NOT COVERED BY MEDICARE PART B Following completion of primary series, a booster dose should be given every 10 years to maintain immunity against tetanus. Td may also be given as tetanus wound prophylaxis. Tdap Vaccine - COST NOT COVERED BY MEDICARE PART B Recommended at least once for all adults. For pregnant patients, recommended with each pregnancy. Shingles Vaccine (Shingrix) - COST NOT COVERED BY MEDICARE PART B  2 shot series recommended in those 19 years and older who have or will have weakened immune systems or those 50 years and older     Health Maintenance Due:      Topic Date Due    Hepatitis C Screening  Never done    Colorectal Cancer Screening  Never done     Immunizations Due:      Topic Date Due    COVID-19 Vaccine (6 - Pfizer series) 01/23/2023     Advance Directives   What are advance directives? Advance directives are legal documents that state your wishes and plans for medical care. These plans are made ahead of time in case you lose your ability to make decisions for yourself. Advance directives can apply to any medical decision, such as the treatments you want, and if you want to donate organs. What are the types of advance directives? There are many types of advance directives, and each state has rules about how to use them. You may choose a combination of any of the following:  Living will:   This is a written record of the treatment you want. You can also choose which treatments you do not want, which to limit, and which to stop at a certain time. This includes surgery, medicine, IV fluid, and tube feedings. Durable power of  for [facility]): This is a written record that states who you want to make healthcare choices for you when you are unable to make them for yourself. This person, called a proxy, is usually a family member or a friend. You may choose more than 1 proxy. Do not resuscitate (DNR) order:  A DNR order is used in case your heart stops beating or you stop breathing. It is a request not to have certain forms of treatment, such as CPR. A DNR order may be included in other types of advance directives. Medical directive: This covers the care that you want if you are in a coma, near death, or unable to make decisions for yourself. You can list the treatments you want for each condition. Treatment may include pain medicine, surgery, blood transfusions, dialysis, IV or tube feedings, and a ventilator (breathing machine). Values history: This document has questions about your views, beliefs, and how you feel and think about life. This information can help others choose the care that you would choose. Why are advance directives important? An advance directive helps you control your care. Although spoken wishes may be used, it is better to have your wishes written down. Spoken wishes can be misunderstood, or not followed. Treatments may be given even if you do not want them. An advance directive may make it easier for your family to make difficult choices about your care. Weight Management   Why it is important to manage your weight:  Being overweight increases your risk of health conditions such as heart disease, high blood pressure, type 2 diabetes, and certain types of cancer. It can also increase your risk for osteoarthritis, sleep apnea, and other respiratory problems.  Aim for a slow, steady weight loss. Even a small amount of weight loss can lower your risk of health problems. How to lose weight safely:  A safe and healthy way to lose weight is to eat fewer calories and get regular exercise. You can lose up about 1 pound a week by decreasing the number of calories you eat by 500 calories each day. Healthy meal plan for weight management:  A healthy meal plan includes a variety of foods, contains fewer calories, and helps you stay healthy. A healthy meal plan includes the following:  Eat whole-grain foods more often. A healthy meal plan should contain fiber. Fiber is the part of grains, fruits, and vegetables that is not broken down by your body. Whole-grain foods are healthy and provide extra fiber in your diet. Some examples of whole-grain foods are whole-wheat breads and pastas, oatmeal, brown rice, and bulgur. Eat a variety of vegetables every day. Include dark, leafy greens such as spinach, kale, nakia greens, and mustard greens. Eat yellow and orange vegetables such as carrots, sweet potatoes, and winter squash. Eat a variety of fruits every day. Choose fresh or canned fruit (canned in its own juice or light syrup) instead of juice. Fruit juice has very little or no fiber. Eat low-fat dairy foods. Drink fat-free (skim) milk or 1% milk. Eat fat-free yogurt and low-fat cottage cheese. Try low-fat cheeses such as mozzarella and other reduced-fat cheeses. Choose meat and other protein foods that are low in fat. Choose beans or other legumes such as split peas or lentils. Choose fish, skinless poultry (chicken or turkey), or lean cuts of red meat (beef or pork). Before you cook meat or poultry, cut off any visible fat. Use less fat and oil. Try baking foods instead of frying them. Add less fat, such as margarine, sour cream, regular salad dressing and mayonnaise to foods. Eat fewer high-fat foods. Some examples of high-fat foods include french fries, doughnuts, ice cream, and cakes.   Eat fewer sweets. Limit foods and drinks that are high in sugar. This includes candy, cookies, regular soda, and sweetened drinks. Exercise:  Exercise at least 30 minutes per day on most days of the week. Some examples of exercise include walking, biking, dancing, and swimming. You can also fit in more physical activity by taking the stairs instead of the elevator or parking farther away from stores. Ask your healthcare provider about the best exercise plan for you. © Copyright [employer] 2018 Information is for End User's use only and may not be sold, redistributed or otherwise used for commercial purposes.  All illustrations and images included in CareNotes® are the copyrighted property of [employer].A[employer]., Inc[employer] or  [employer]

## 2023-12-04 NOTE — ASSESSMENT & PLAN NOTE
Vaccinations: had covid pfizer and RSV in [place]!, rest vaccines all UTD  Advance care planning:has POA in chart, POA is wife [place]  Colon cancer screen:had cologuard at previous pcp's more than 3y ago. Due for screen. Prefer to do colonoscopy this time, refer to GI.    AAA screen: done, no aneurysm  Healthy diet and regular exercise

## 2023-12-04 NOTE — PROGRESS NOTES
Assessment and Plan:     Problem List Items Addressed This Visit       Medicare annual wellness visit, subsequent - Primary     Vaccinations: had covid pfizer and RSV in [place], rest vaccines all UTD  Advance care planning:has POA in chart, POA is wife Subhash Mccann  Colon cancer screen:had cologuard at previous pcp's more than 3y ago. Due for screen. Prefer to do colonoscopy this time, refer to GI. AAA screen: done, no aneurysm  Healthy diet and regular exercise            Other Visit Diagnoses       Colon cancer screening        Relevant Orders    Ambulatory referral to Gastroenterology    Prediabetes        Relevant Orders    CBC and differential    Basic metabolic panel    Lipid panel    Hemoglobin A1C    PSA, Total Screen    Vitamin D 25 hydroxy    Vitamin D deficiency        Relevant Orders    Vitamin D 25 hydroxy    Prostate cancer screening        Relevant Orders    PSA, Total Screen    Hyperlipidemia, unspecified hyperlipidemia type        Relevant Orders    Lipid panel            Depression Screening and Follow-up Plan: Patient was screened for depression during today's encounter. They screened negative with a PHQ-2 score of 0. Preventive health issues were discussed with patient, and age appropriate screening tests were ordered as noted in patient's After Visit Summary. Personalized health advice and appropriate referrals for health education or preventive services given if needed, as noted in patient's After Visit Summary.      History of Present Illness:     Patient presents for a Medicare Wellness Visit    HPI   Patient Care Team:  Kun Weiss MD as PCP - General (Internal Medicine)     Review of Systems:     Review of Systems     Problem List:     Patient Active Problem List   Diagnosis    Skin lesion    Decreased hearing of both ears    Primary hypertension    Benign prostatic hyperplasia without lower urinary tract symptoms    Osteopenia    Numbness of right foot    Atherosclerosis of abdominal aorta [facility])    Injury of right foot    Primary insomnia    Urinary tract infection with hematuria    Medicare annual wellness visit, subsequent      Past Medical and Surgical History:     Past Medical History:   Diagnosis Date    HL (hearing loss)     Hypertension     Spontaneous pneumothorax     When 22years old     Past Surgical History:   Procedure Laterality Date    LAMINECTOMY      VASECTOMY      per patient, [id number]      Family History:     Family History   Problem Relation Age of Onset    Prostate cancer Father     Hypertension Father     Cancer Father         erminal prostate cancer      Social History:     Social History     Socioeconomic History    Marital status: Single     Spouse name: None    Number of children: None    Years of education: None    Highest education level: None   Occupational History    None   Tobacco Use    Smoking status: Former     Packs/day: 1.00     Years: 15.00     Total pack years: 15.00     Types: Cigarettes     Start date: 1968     Quit date: 1982     Years since quittin.9    Smokeless tobacco: Never    Tobacco comments:     2ppd age [age], then [age], then quit   Vaping Use    Vaping Use: Never used   Substance and Sexual Activity    Alcohol use: Yes     Alcohol/week: 8.0 standard drinks of alcohol     Types: 5 Glasses of wine, 3 Shots of liquor per week    Drug use: No    Sexual activity: Yes     Partners: Female     Birth control/protection: None   Other Topics Concern    None   Social History Narrative    None     Social Determinants of Health     Financial Resource Strain: Low Risk  (2023)    Overall Financial Resource Strain (CARDIA)     Difficulty of Paying Living Expenses: Not hard at all   Food Insecurity: Not on file   Transportation Needs: No Transportation Needs (2023)    PRAPARE - Transportation     Lack of Transportation (Medical): No     Lack of Transportation (Non-Medical):  No   Physical Activity: Not on file   Stress: Not on file Social Connections: Not on file   Intimate Partner Violence: Not on file   Housing Stability: Not on file      Medications and Allergies:     Current Outpatient Medications   Medication Sig Dispense Refill    Cholecalciferol (Vitamin D) 50 MCG (2000 UT) tablet Take 2,000 Units by mouth daily      finasteride (PROSCAR) 5 mg tablet Take 1 tablet (5 mg total) by mouth daily 30 tablet 6    losartan (COZAAR) 100 MG tablet Take 100 mg by mouth daily       No current facility-administered medications for this visit. No Known Allergies   Immunizations:     Immunization History   Administered Date(s) Administered    COVID-19 PFIZER VACCINE 0.3 ML IM 01/13/2021, 02/04/2021, 09/28/2021    COVID-19 Pfizer Vac BIVALENT Harshal-sucrose 12 Yr+ IM 09/23/2022    COVID-19 Pfizer vac (Harshal-sucrose, gray cap) 12 yr+ IM 04/05/2022    Hep A, adult 11/07/2019    INFLUENZA 10/16/2014, 10/23/2018, 10/07/2019, 09/28/2021, 10/17/2022    Influenza, high dose seasonal 0.7 mL 10/16/2023    Influenza, seasonal, injectable 11/10/2015    Influenza, seasonal, injectable, preservative free 12/07/2016    Pneumococcal Conjugate 13-Valent 12/07/2016    Pneumococcal Polysaccharide PPV23 12/15/2017    Tdap 10/16/2014    Zoster Vaccine Recombinant 12/15/2017, 05/20/2018      Health Maintenance:         Topic Date Due    Hepatitis C Screening  Never done    Colorectal Cancer Screening  Never done         Topic Date Due    COVID-19 Vaccine (6 - Pfizer series) 01/23/2023      Medicare Screening Tests and Risk Assessments:     Lena Chung is here for his Welcome to Medicare visit. Health Risk Assessment:   Patient rates overall health as good. Patient feels that their physical health rating is same. Patient is satisfied with their life. Eyesight was rated as slightly worse. Hearing was rated as same. Patient feels that their emotional and mental health rating is same. Patients states they are sometimes angry.  Patient states they are sometimes unusually tired/fatigued. Pain experienced in the last 7 days has been none. Patient states that he has experienced no weight loss or gain in last 6 months. Depression Screening:   PHQ-2 Score: 0      Fall Risk Screening: In the past year, patient has experienced: no history of falling in past year      Home Safety:  Patient does not have trouble with stairs inside or outside of their home. Patient has working smoke alarms and has working carbon monoxide detector. Home safety hazards include: none. Nutrition:   Current diet is Regular and Limited junk food. Medications:   Patient is currently taking over-the-counter supplements. OTC medications include: see medication list. Patient is able to manage medications. Activities of Daily Living (ADLs)/Instrumental Activities of Daily Living (IADLs):   Walk and transfer into and out of bed and chair?: Yes  Dress and groom yourself?: Yes    Bathe or shower yourself?: Yes    Feed yourself? Yes  Do your laundry/housekeeping?: Yes  Manage your money, pay your bills and track your expenses?: Yes  Make your own meals?: Yes    Do your own shopping?: Yes    Previous Hospitalizations:   Any hospitalizations or ED visits within the last 12 months?: No      Advance Care Planning:   Living will: Yes    Durable POA for healthcare:  Yes    Advanced directive: Yes      PREVENTIVE SCREENINGS      Cardiovascular Screening:    General: Screening Not Indicated and History Lipid Disorder      Diabetes Screening:     General: Screening Current      Prostate Cancer Screening:    General: Screening Current      Abdominal Aortic Aneurysm (AAA) Screening:    Risk factors include: age between 70-75 yo and tobacco use        Lung Cancer Screening:     General: Screening Not Indicated    Screening, Brief Intervention, and Referral to Treatment (SBIRT)    Screening  Typical number of drinks in a day: 0  Typical number of drinks in a week: 4  Interpretation: Low risk drinking behavior. AUDIT-C Screenin) How often did you have a drink containing alcohol in the past year? 2 to 4 times a month  2) How many drinks did you have on a typical day when you were drinking in the past year? 1 to 2  3) How often did you have 6 or more drinks on one occasion in the past year? never    AUDIT-C Score: 2  Interpretation: Score 0-3 (male): Negative screen for alcohol misuse    Single Item Drug Screening:  How often have you used an illegal drug (including marijuana) or a prescription medication for non-medical reasons in the past year? never    Single Item Drug Screen Score: 0  Interpretation: Negative screen for possible drug use disorder    No results found.      Physical Exam:     /70 (BP Location: Left arm, Patient Position: Sitting, Cuff Size: Standard)   Pulse (!) 54   Ht 5' 8" (1.727 m)   Wt 80 kg (176 lb 6.4 oz)   SpO2 99%   BMI 26.82 kg/m²     Physical Exam     Teresita Shankar MD

## 2023-12-07 ENCOUNTER — FOLLOW UP (OUTPATIENT)
Dept: URBAN - METROPOLITAN AREA CLINIC 6 | Facility: CLINIC | Age: 73
End: 2023-12-07

## 2023-12-07 DIAGNOSIS — H40.013: ICD-10-CM

## 2023-12-07 DIAGNOSIS — H04.123: ICD-10-CM

## 2023-12-07 DIAGNOSIS — H53.2: ICD-10-CM

## 2023-12-07 PROCEDURE — 92014 COMPRE OPH EXAM EST PT 1/>: CPT

## 2023-12-07 ASSESSMENT — VISUAL ACUITY
OS_SC: 20/40
OS_CC: 20/25
OD_PH: 20/20-2
OU_CC: J1+
OS_PH: 20/20-1
OD_SC: 20/40
OD_CC: 20/30

## 2023-12-07 ASSESSMENT — TONOMETRY
OD_IOP_MMHG: 10
OS_IOP_MMHG: 10

## 2023-12-11 DIAGNOSIS — N40.1 BENIGN PROSTATIC HYPERPLASIA WITH LOWER URINARY TRACT SYMPTOMS, SYMPTOM DETAILS UNSPECIFIED: ICD-10-CM

## 2023-12-11 RX ORDER — FINASTERIDE 5 MG/1
5 TABLET, FILM COATED ORAL DAILY
Qty: 30 TABLET | Refills: 5 | Status: SHIPPED | OUTPATIENT
Start: 2023-12-11

## 2023-12-13 ENCOUNTER — PREP FOR PROCEDURE (OUTPATIENT)
Age: 73
End: 2023-12-13

## 2023-12-13 ENCOUNTER — TELEPHONE (OUTPATIENT)
Age: 73
End: 2023-12-13

## 2023-12-13 DIAGNOSIS — Z12.11 SCREENING FOR COLON CANCER: Primary | ICD-10-CM

## 2023-12-15 PROBLEM — N39.0 URINARY TRACT INFECTION WITH HEMATURIA: Status: RESOLVED | Noted: 2023-10-16 | Resolved: 2023-12-15

## 2023-12-15 PROBLEM — R31.9 URINARY TRACT INFECTION WITH HEMATURIA: Status: RESOLVED | Noted: 2023-10-16 | Resolved: 2023-12-15

## 2023-12-17 ENCOUNTER — HOSPITAL ENCOUNTER (OUTPATIENT)
Dept: MRI IMAGING | Facility: HOSPITAL | Age: 73
Discharge: HOME | End: 2023-12-17
Payer: MEDICARE

## 2023-12-17 DIAGNOSIS — H53.2 DIPLOPIA: ICD-10-CM

## 2023-12-17 PROCEDURE — A9585 GADOBUTROL INJECTION: HCPCS | Performed by: GENERAL ACUTE CARE HOSPITAL

## 2023-12-17 PROCEDURE — 70543 MRI ORBT/FAC/NCK W/O &W/DYE: CPT

## 2023-12-17 PROCEDURE — 70553 MRI BRAIN STEM W/O & W/DYE: CPT

## 2023-12-17 PROCEDURE — G1004 CDSM NDSC: HCPCS

## 2023-12-17 RX ORDER — GADOBUTROL 604.72 MG/ML
8 INJECTION INTRAVENOUS
Status: COMPLETED | OUTPATIENT
Start: 2023-12-17 | End: 2023-12-17

## 2023-12-17 RX ADMIN — GADOBUTROL 8 ML: 604.72 INJECTION INTRAVENOUS at 10:43

## 2023-12-20 NOTE — TELEPHONE ENCOUNTER
12/13/23  Screened by: Tanika Luna    Referring Provider Teresita Shankar    Pre- Screening: There is no height or weight on file to calculate BMI. Has patient been referred for a routine screening Colonoscopy? yes  Is the patient between 43-73 years old? yes    PT PASSED OA     Previous Colonoscopy yes   If yes:    Date: 10 yrs. Facility:     Reason:       SCHEDULING STAFF: If the patient is between 45yrs-49yrs, please advise patient to confirm benefits/coverage with their insurance company for a routine screening colonoscopy, some insurance carriers will only cover at [address] [place] [address] or older. If the patient is over 66years old, please schedule an office visit. Does the patient want to see a Gastroenterologist prior to their procedure OR are they having any GI symptoms? no    Has the patient been hospitalized or had abdominal surgery in the past 6 months? no    Does the patient use supplemental oxygen? no    Does the patient take Coumadin, Lovenox, Plavix, Elliquis, Xarelto, or other blood thinning medication? no    Has the patient had a stroke, cardiac event, or stent placed in the past year? no    SCHEDULING STAFF: If patient answers NO to above questions, then schedule procedure. If patient answers YES to above questions, then schedule office appointment. If patient is between 45yrs - 49yrs, please advise patient that we will have to confirm benefits & coverage with their insurance company for a routine screening colonoscopy.
ASC Screening    ASC Screening  BMI > than 45: No  Are you currently pregnant?: No  Do you rely on a wheelchair for mobility?: No  Do you need oxygen during the day?: No  Have you ever been informed by anesthesia that you have a difficult airway?: No  Have you been diagnosed with End Stage Renal Disease (ESRD)?: No  Are you actively on dialysis?: No  Have you been diagnosed with Pulmonary Hypertension?: No  Do you have a pacemaker or an Automatic Implantable Cardioverter Defibrillator (AICD)?: No  Have you ever had an organ transplant?: No  Have you had a stroke, heart attack, myocardial infarction (MI) within the last 6 months?: No  Have you ever been diagnosed with Aortic Stenosis?: No  Have you ever been diagnosed  with Congestive Heart Failure?: No  Have you ever been diagnosed with a heart valve disease?: No  Are you Diabetic?: No  If you are Diabetic, has your A1C been greater than 12 within the last six months?: N/A
Scheduled date of colonoscopy (as of today): 1/16/24  Physician performing colonoscopy: Jenny Roberts  Location of colonoscopy: [facility]  Bowel prep reviewed with patient: josef/cameron  Instructions reviewed with patient by: alta  Clearances: n/a
normal (ped)...

## 2024-01-02 ENCOUNTER — ANESTHESIA EVENT (OUTPATIENT)
Dept: ANESTHESIOLOGY | Facility: AMBULATORY SURGERY CENTER | Age: 74
End: 2024-01-02

## 2024-01-02 ENCOUNTER — ANESTHESIA (OUTPATIENT)
Dept: ANESTHESIOLOGY | Facility: AMBULATORY SURGERY CENTER | Age: 74
End: 2024-01-02

## 2024-01-15 RX ORDER — LIDOCAINE HYDROCHLORIDE 10 MG/ML
0.5 INJECTION, SOLUTION EPIDURAL; INFILTRATION; INTRACAUDAL; PERINEURAL ONCE AS NEEDED
Status: CANCELLED | OUTPATIENT
Start: 2024-01-15

## 2024-01-16 ENCOUNTER — ANESTHESIA (OUTPATIENT)
Dept: GASTROENTEROLOGY | Facility: AMBULATORY SURGERY CENTER | Age: 74
End: 2024-01-16

## 2024-01-16 ENCOUNTER — ANESTHESIA EVENT (OUTPATIENT)
Dept: GASTROENTEROLOGY | Facility: AMBULATORY SURGERY CENTER | Age: 74
End: 2024-01-16

## 2024-01-16 ENCOUNTER — HOSPITAL ENCOUNTER (OUTPATIENT)
Dept: GASTROENTEROLOGY | Facility: AMBULATORY SURGERY CENTER | Age: 74
Discharge: HOME | End: 2024-01-16
Payer: MEDICARE

## 2024-01-16 VITALS
DIASTOLIC BLOOD PRESSURE: 66 MMHG | HEART RATE: 49 BPM | TEMPERATURE: 98.3 F | BODY MASS INDEX: 26.67 KG/M2 | HEIGHT: 68 IN | WEIGHT: 176 LBS | RESPIRATION RATE: 20 BRPM | OXYGEN SATURATION: 94 % | SYSTOLIC BLOOD PRESSURE: 107 MMHG

## 2024-01-16 DIAGNOSIS — Z12.11 SCREENING FOR COLON CANCER: ICD-10-CM

## 2024-01-16 DIAGNOSIS — Z86.010 HISTORY OF COLON POLYPS: ICD-10-CM

## 2024-01-16 PROCEDURE — G0121 COLON CA SCRN NOT HI RSK IND: HCPCS | Performed by: INTERNAL MEDICINE

## 2024-01-16 RX ORDER — LIDOCAINE HYDROCHLORIDE 10 MG/ML
0.5 INJECTION, SOLUTION EPIDURAL; INFILTRATION; INTRACAUDAL; PERINEURAL ONCE AS NEEDED
Status: DISCONTINUED | OUTPATIENT
Start: 2024-01-16 | End: 2024-01-20 | Stop reason: HOSPADM

## 2024-01-16 RX ORDER — SODIUM CHLORIDE, SODIUM LACTATE, POTASSIUM CHLORIDE, CALCIUM CHLORIDE 600; 310; 30; 20 MG/100ML; MG/100ML; MG/100ML; MG/100ML
INJECTION, SOLUTION INTRAVENOUS CONTINUOUS PRN
Status: DISCONTINUED | OUTPATIENT
Start: 2024-01-16 | End: 2024-01-16

## 2024-01-16 RX ORDER — PROPOFOL 10 MG/ML
INJECTION, EMULSION INTRAVENOUS AS NEEDED
Status: DISCONTINUED | OUTPATIENT
Start: 2024-01-16 | End: 2024-01-16

## 2024-01-16 RX ADMIN — PROPOFOL 100 MG: 10 INJECTION, EMULSION INTRAVENOUS at 09:51

## 2024-01-16 RX ADMIN — PROPOFOL 50 MG: 10 INJECTION, EMULSION INTRAVENOUS at 09:59

## 2024-01-16 RX ADMIN — PROPOFOL 50 MG: 10 INJECTION, EMULSION INTRAVENOUS at 09:53

## 2024-01-16 RX ADMIN — SODIUM CHLORIDE, SODIUM LACTATE, POTASSIUM CHLORIDE, CALCIUM CHLORIDE: 600; 310; 30; 20 INJECTION, SOLUTION INTRAVENOUS at 09:37

## 2024-01-16 RX ADMIN — PROPOFOL 50 MG: 10 INJECTION, EMULSION INTRAVENOUS at 09:56

## 2024-01-16 NOTE — ANESTHESIA POSTPROCEDURE EVALUATION
Post-Op Assessment Note    CV Status:  Stable  Pain Score: 0    Pain management: adequate       Mental Status:  Sleepy and arousable   Hydration Status:  Stable   PONV Controlled:  Controlled   Airway Patency:  Patent     Post Op Vitals Reviewed: Yes      Staff: CRNA               BP   102/68   Temp      Pulse  66   Resp   12   SpO2   98

## 2024-01-16 NOTE — H&P
"History and Physical -  [facility]  Michael Mckenzie 73 y.o. male MRN: [id number]        HPI: 73-year-old male with history of colon polyps was referred for colonoscopy.  Regular bowel movements.    Historical Information   Past Medical History:   Diagnosis Date    Colon polyp     Enlarged prostate     HL (hearing loss)     Hypertension     Spontaneous pneumothorax     When 21 years old     Past Surgical History:   Procedure Laterality Date    COLONOSCOPY      LAMINECTOMY      TONSILLECTOMY      VASECTOMY      per patient, [id number]     Social History   Social History     Substance and Sexual Activity   Alcohol Use Yes    Alcohol/week: 8.0 standard drinks of alcohol    Types: 5 Glasses of wine, 3 Shots of liquor per week     Social History     Substance and Sexual Activity   Drug Use No     Social History     Tobacco Use   Smoking Status Former    Current packs/day: 0.00    Average packs/day: 1 pack/day for 15.0 years (15.0 ttl pk-yrs)    Types: Cigarettes    Start date: 1968    Quit date: 1982    Years since quittin.0   Smokeless Tobacco Never   Tobacco Comments    1ppd age [age], then [age], then quit     Family History   Problem Relation Age of Onset    Prostate cancer Father     Hypertension Father     Cancer Father         erminal prostate cancer       Meds/Allergies     (Not in a hospital admission)      No Known Allergies    Objective     Blood pressure 131/70, pulse (!) 53, temperature 98.3 °F (36.8 °C), temperature source Temporal, resp. rate 18, height 5' 8\" (1.727 m), weight 79.8 kg (176 lb), SpO2 98%.    PHYSICAL EXAM:    Gen: NAD  CV: S1 & S2 normal, RRR  CHEST: Clear to auscultate  ABD: soft, NT/ND, good bowel sounds  EXT: no edema    ASSESSMENT:     History of colon polyps    PLAN:    Colonoscopy              "

## 2024-01-16 NOTE — ANESTHESIA PROCEDURE NOTES
Anesthesia Notable Event  No anethesia notable event occurred.    Date/Time: 1/16/2024 10:55 AM    Performed by: Deon Franklin MD  Authorized by: Deon Franklin MD

## 2024-02-01 PROBLEM — Z00.00 MEDICARE ANNUAL WELLNESS VISIT, SUBSEQUENT: Status: RESOLVED | Noted: 2023-12-03 | Resolved: 2024-02-01

## 2024-02-05 DIAGNOSIS — I10 PRIMARY HYPERTENSION: Primary | ICD-10-CM

## 2024-02-05 RX ORDER — LOSARTAN POTASSIUM 100 MG/1
100 TABLET ORAL DAILY
Qty: 30 TABLET | Refills: 0 | Status: SHIPPED | OUTPATIENT
Start: 2024-02-05

## 2024-02-07 ENCOUNTER — TELEPHONE (OUTPATIENT)
Age: 74
End: 2024-02-07

## 2024-02-07 DIAGNOSIS — N40.1 BENIGN PROSTATIC HYPERPLASIA WITH LOWER URINARY TRACT SYMPTOMS, SYMPTOM DETAILS UNSPECIFIED: Primary | ICD-10-CM

## 2024-02-07 NOTE — TELEPHONE ENCOUNTER
Pt called see what blood test he has to have done for the CT. But the scripts are not in the chart.     Please place order before CT is scheduled on 02/13/24    CBN: [phone]

## 2024-02-09 ENCOUNTER — APPOINTMENT (OUTPATIENT)
Dept: LAB | Facility: CLINIC | Age: 74
End: 2024-02-09
Payer: MEDICARE

## 2024-02-09 DIAGNOSIS — N40.1 BENIGN PROSTATIC HYPERPLASIA WITH LOWER URINARY TRACT SYMPTOMS, SYMPTOM DETAILS UNSPECIFIED: ICD-10-CM

## 2024-02-09 LAB
ANION GAP SERPL CALCULATED.3IONS-SCNC: 4 MMOL/L
BUN SERPL-MCNC: 26 MG/DL (ref 5–25)
CALCIUM SERPL-MCNC: 9.1 MG/DL (ref 8.4–10.2)
CHLORIDE SERPL-SCNC: 110 MMOL/L (ref 96–108)
CO2 SERPL-SCNC: 26 MMOL/L (ref 21–32)
CREAT SERPL-MCNC: 0.99 MG/DL (ref 0.6–1.3)
GFR SERPL CREATININE-BSD FRML MDRD: 75 ML/MIN/1.73SQ M
GLUCOSE SERPL-MCNC: 118 MG/DL (ref 65–140)
POTASSIUM SERPL-SCNC: 4.1 MMOL/L (ref 3.5–5.3)
SODIUM SERPL-SCNC: 140 MMOL/L (ref 135–147)

## 2024-02-09 PROCEDURE — 80048 BASIC METABOLIC PNL TOTAL CA: CPT

## 2024-02-09 PROCEDURE — 36415 COLL VENOUS BLD VENIPUNCTURE: CPT

## 2024-02-23 ENCOUNTER — HOSPITAL ENCOUNTER (OUTPATIENT)
Dept: CT IMAGING | Facility: HOSPITAL | Age: 74
Discharge: HOME | End: 2024-02-23
Attending: UROLOGY
Payer: MEDICARE

## 2024-02-23 DIAGNOSIS — R31.0 GROSS HEMATURIA: ICD-10-CM

## 2024-02-23 PROCEDURE — 74178 CT ABD&PLV WO CNTR FLWD CNTR: CPT

## 2024-02-23 PROCEDURE — G1004 CDSM NDSC: HCPCS

## 2024-02-23 RX ADMIN — IOHEXOL 100 ML: 350 INJECTION, SOLUTION INTRAVENOUS at 15:59

## 2024-02-27 DIAGNOSIS — I10 PRIMARY HYPERTENSION: ICD-10-CM

## 2024-02-27 RX ORDER — LOSARTAN POTASSIUM 100 MG/1
100 TABLET ORAL DAILY
Qty: 30 TABLET | Refills: 5 | Status: SHIPPED | OUTPATIENT
Start: 2024-02-27

## 2024-02-27 NOTE — TELEPHONE ENCOUNTER
"Pt sent my chart message requesting a refill on Losartan 100 mg    \"Please CHANGE my preferred pharmacist to:  [facility] - [address], [place]     Please order the maintenance medication Losartan 100MG for a 90-DAY SUPPLY with 4 refills.     Please confirm this request.     Thank you,  Tio Mckenzie\"  "

## 2024-03-05 ENCOUNTER — TELEPHONE (OUTPATIENT)
Dept: UROLOGY | Facility: CLINIC | Age: 74
End: 2024-03-05

## 2024-03-05 NOTE — TELEPHONE ENCOUNTER
Please let the patient know that his CAT scan shows a calcified UroLift implant.    Please assess if the patient is having any urinary difficulties or ongoing hematuria.  If so, he can be offered a diagnostic cystoscopy    If the patient is doing well clinically then we can continue his BPH medications and follow-up as scheduled.  Thanks

## 2024-03-06 NOTE — TELEPHONE ENCOUNTER
Left message relaying note from Dr. Dixon.    *If pt calls back please ask him if he is having any symptoms (listed from Tasha note,) and if so then next available cysto and if not already has apt schedule with zunilda at end of march. Thanks

## 2024-03-06 NOTE — TELEPHONE ENCOUNTER
Patient called back and I found a cysto for him at [facility] for 5/3. He still would like to speak to Dr. Dixon about his medications.    CB: [phone]

## 2024-03-06 NOTE — TELEPHONE ENCOUNTER
Chart reviewed. Finasteride was started to try to help symptoms. If patient having bothersome side effects, then we will just perform cystoscopy as next step. Thanks

## 2024-03-06 NOTE — TELEPHONE ENCOUNTER
"Patient called back and did state he is still having hematuria and reports difficulty starting urine stream. Advised of need for Cysto and transferred to scheduling for that.    Patient also states he sent a [employer] message to Dr. Dixon in January and want to make sure he got the message;    \"FYI: I stopped taking finasteride after about 30 days of use. The good news is I have had no incidents of gross hematuria since I started the medication. This was the key rationale for using finasteride. However, there were noticeable sexual side effects: loss of drive and partial loss of erectile function. My reading suggests that sexual side effects of this medication can be irreversible. This would not be an acceptable outcome for me so I discontinued the medication. When I stopped the finasteride sexual function returned to normal.\"  "

## 2024-03-06 NOTE — TELEPHONE ENCOUNTER
Pt called to schedule cysto   1st available end of July   Pt does not want to wait that long and asking if he can get it done by someone else.   Pt has symptoms and wants to get cysto ASAP     Please review

## 2024-03-26 ENCOUNTER — FOLLOW UP (OUTPATIENT)
Dept: URBAN - METROPOLITAN AREA CLINIC 6 | Facility: CLINIC | Age: 74
End: 2024-03-26

## 2024-03-26 DIAGNOSIS — H40.013: ICD-10-CM

## 2024-03-26 DIAGNOSIS — H53.2: ICD-10-CM

## 2024-03-26 DIAGNOSIS — H04.123: ICD-10-CM

## 2024-03-26 PROCEDURE — 92133 CPTRZD OPH DX IMG PST SGM ON: CPT

## 2024-03-26 PROCEDURE — 92012 INTRM OPH EXAM EST PATIENT: CPT

## 2024-03-26 ASSESSMENT — TONOMETRY
OS_IOP_MMHG: 16
OD_IOP_MMHG: 16

## 2024-03-26 ASSESSMENT — VISUAL ACUITY
OS_SC: 20/30-2
OD_SC: 20/40

## 2024-04-03 ENCOUNTER — PROCEDURE VISIT (OUTPATIENT)
Dept: UROLOGY | Facility: CLINIC | Age: 74
End: 2024-04-03
Payer: MEDICARE

## 2024-04-03 VITALS
BODY MASS INDEX: 26.37 KG/M2 | WEIGHT: 174 LBS | OXYGEN SATURATION: 96 % | DIASTOLIC BLOOD PRESSURE: 72 MMHG | HEIGHT: 68 IN | SYSTOLIC BLOOD PRESSURE: 118 MMHG | HEART RATE: 56 BPM

## 2024-04-03 DIAGNOSIS — N40.1 BENIGN PROSTATIC HYPERPLASIA WITH LOWER URINARY TRACT SYMPTOMS, SYMPTOM DETAILS UNSPECIFIED: Primary | ICD-10-CM

## 2024-04-03 PROCEDURE — 52000 CYSTOURETHROSCOPY: CPT | Performed by: UROLOGY

## 2024-04-03 PROCEDURE — 99213 OFFICE O/P EST LOW 20 MIN: CPT | Performed by: UROLOGY

## 2024-04-03 NOTE — PROGRESS NOTES
Referring Physician: Annmarie Bowden MD  A copy of this note was sent to the referring physician.       Diagnoses and all orders for this visit:    Benign prostatic hyperplasia with lower urinary tract symptoms, symptom details unspecified  -     Cystoscopy            Assessment and plan:       1.  Refractory BPH with lower urinary tract symptoms  -s/p urolift  6/18/2021 (Dr [place], [place])  -Exposed hardware was present on cystoscopy today within the prostatic fossa and within the bladder neck on retroflexion.  Patient is surprisingly asymptomatic and we discussed observation    2. Persistent gross hematuria since the procedure  - outside cystoscopy (records reviewed) describe hypervascularity within prostatic fossa  - has ultrasound for upper tract imaging, but no axial imaging    3.  Recent UTI  -Urine was not submitted for culture    4.  Familial history of aggressive prostate cancer  Prostate cancer screening  -PSA 0.5 March 2023    #5 [facility], General preventative care  - Hemoglobin A1c 5.8  - BMI 26  -LDL of 93 as noted        We discussed options for the patient's current status.  He does have 2 pieces of exposed hardware within the prostatic fossa and the bladder neck.  He is surprisingly asymptomatic however.  We discussed typical indications for intervention would be ongoing hematuria, UTI or worsening lower urinary tract symptoms.  For now he is comfortable with observation    He will return annually with our [facility] practitioner team or sooner if needed.    Pal Dixon MD      Chief Complaint     Consultation for BPH and history of UTI      History of Present Illness     Michael Jonah is a 73 y.o.     Detailed Urologic History     - please refer to HPI    Review of Systems     Review of Systems   Constitutional: Negative for activity change and fatigue.   HENT: Negative for congestion.    Eyes: Negative for visual disturbance.   Respiratory: Negative for shortness of breath and wheezing.     Cardiovascular: Negative for chest pain and leg swelling.   Gastrointestinal: Negative for abdominal pain.   Endocrine: Negative for polyuria.   Genitourinary: Negative for dysuria, flank pain, hematuria and urgency.   Musculoskeletal: Negative for back pain.   Allergic/Immunologic: Negative for immunocompromised state.   Neurological: Negative for dizziness and numbness.   Psychiatric/Behavioral: Negative for dysphoric mood.   All other systems reviewed and are negative.      AUA SYMPTOM SCORE      Flowsheet Row Most Recent Value   AUA SYMPTOM SCORE    How often have you had a sensation of not emptying your bladder completely after you finished urinating? 1 (P)     How often have you had to urinate again less than two hours after you finished urinating? 3 (P)     How often have you found you stopped and started again several times when you urinate? 1 (P)     How often have you found it difficult to postpone urination? 4 (P)     How often have you had a weak urinary stream? 2 (P)     How often have you had to push or strain to begin urination? 1 (P)     How many times did you most typically get up to urinate from the time you went to bed at night until the time you got up in the morning? 2 (P)     Quality of Life: If you were to spend the rest of your life with your urinary condition just the way it is now, how would you feel about that? 3 (P)     AUA SYMPTOM SCORE 14 (P)               Allergies     No Known Allergies    Physical Exam       Physical Exam  Constitutional:       General: He is not in acute distress.     Appearance: He is well-developed.   HENT:      Head: Normocephalic and atraumatic.   Cardiovascular:      Comments: Negative lower extremity edema  Pulmonary:      Effort: Pulmonary effort is normal.      Breath sounds: Normal breath sounds.   Abdominal:      Palpations: Abdomen is soft.   Musculoskeletal:         General: Normal range of motion.      Cervical back: Normal range of motion.   Skin:     " General: Skin is warm.   Neurological:      Mental Status: He is alert and oriented to person, place, and time.   Psychiatric:         Behavior: Behavior normal.           Vital Signs  Vitals:    24 1255   BP: 118/72   BP Location: Left arm   Patient Position: Sitting   Cuff Size: Adult   Pulse: 56   SpO2: 96%   Weight: 78.9 kg (174 lb)   Height: 5' 8\" (1.727 m)         Current Medications       Current Outpatient Medications:     Cholecalciferol (Vitamin D) 50 MCG ( UT) tablet, Take 2,000 Units by mouth daily, Disp: , Rfl:     losartan (COZAAR) 100 MG tablet, Take 1 tablet (100 mg total) by mouth daily, Disp: 30 tablet, Rfl: 5      Active Problems     Patient Active Problem List   Diagnosis    Skin lesion    Decreased hearing of both ears    Primary hypertension    Benign prostatic hyperplasia without lower urinary tract symptoms    Osteopenia    Numbness of right foot    Atherosclerosis of abdominal aorta (HCC)    Injury of right foot    Primary insomnia         Past Medical History     Past Medical History:   Diagnosis Date    Colon polyp     Enlarged prostate     HL (hearing loss)     Hypertension     Spontaneous pneumothorax     When 21 years old         Surgical History     Past Surgical History:   Procedure Laterality Date    COLONOSCOPY      LAMINECTOMY      TONSILLECTOMY      VASECTOMY      per patient, [id number]         Family History     Family History   Problem Relation Age of Onset    Prostate cancer Father     Hypertension Father     Cancer Father         erminal prostate cancer         Social History     Social History     Social History     Tobacco Use   Smoking Status Former    Current packs/day: 0.00    Average packs/day: 1 pack/day for 15.0 years (15.0 ttl pk-yrs)    Types: Cigarettes    Start date: 1968    Quit date: 1982    Years since quittin.2   Smokeless Tobacco Never   Tobacco Comments    1ppd age [age], then [age], then quit         Pertinent Lab Values     Lab " "Results   Component Value Date    CREATININE 0.99 02/09/2024       Lab Results   Component Value Date    PSA 0.5 03/15/2023       @RESULTRCNT(1H])@      Pertinent Imaging       No results found.      Portions of the record may have been created with voice recognition software.  Occasional wrong word or \"sound a like\" substitutions may have occurred due to the inherent limitations of voice recognition software.  In addition some of the content generated from this outpatient encounter includes information designed for patient education and/or communication back to the referring provider.  Read the chart carefully and recognize, using context, where substitutions have occurred.    "

## 2024-04-03 NOTE — PROGRESS NOTES
Cystoscopy     Date/Time  4/3/2024 1:00 PM     Performed by  Pal Dixon MD   Authorized by  Pal Dixon MD         Procedure Details:  Procedure type: cystoscopy       Office Cystoscopy Procedure Note    Indication:    Gross hematuria, calcification seen in bladder on imaging, history of prior UroLift at an outside institution    Informed consent   The risks, benefits, complications, treatment options, and expected outcomes were discussed with the patient. The patient concurred with the proposed plan and provided informed consent.    Anesthesia  Lidocaine jelly 2%    Procedure  The patient was placed in the supineposition, was prepped and draped in the usual manner using sterile technique, and 2% lidocaine jelly instilled into the urethra.  A 17 F flexible cystoscope was then inserted into the urethra and the urethra and bladder carefully examined.  The following findings were noted:    Findings:  Urethra:  Normal  Prostate:  A urethral end piece is noted in the left mid gland.  This is exposed and also partially calcified.  In addition there is a encrusted 1 cm calculus present fixed at the bladder neck.  I suspect that this is secondary to an exposed prostatic capsular tab.  Bladder:  Normal  Ureteral orifices:  Normal  Other findings:  None     Specimens: None                 Complications:    None; patient tolerated the procedure well           Disposition: To home after 30 minute observation.           Condition: Stable

## 2024-04-10 ENCOUNTER — OFFICE VISIT (OUTPATIENT)
Dept: URGENT CARE | Facility: CLINIC | Age: 74
End: 2024-04-10
Payer: MEDICARE

## 2024-04-10 VITALS
DIASTOLIC BLOOD PRESSURE: 80 MMHG | OXYGEN SATURATION: 98 % | HEART RATE: 52 BPM | BODY MASS INDEX: 27.43 KG/M2 | TEMPERATURE: 97.1 F | RESPIRATION RATE: 18 BRPM | HEIGHT: 68 IN | SYSTOLIC BLOOD PRESSURE: 128 MMHG | WEIGHT: 181 LBS

## 2024-04-10 DIAGNOSIS — H10.31 ACUTE BACTERIAL CONJUNCTIVITIS OF RIGHT EYE: Primary | ICD-10-CM

## 2024-04-10 PROCEDURE — G0463 HOSPITAL OUTPT CLINIC VISIT: HCPCS

## 2024-04-10 PROCEDURE — 99213 OFFICE O/P EST LOW 20 MIN: CPT

## 2024-04-10 RX ORDER — OFLOXACIN 3 MG/ML
1 SOLUTION/ DROPS OPHTHALMIC 4 TIMES DAILY
Qty: 5 ML | Refills: 0 | Status: SHIPPED | OUTPATIENT
Start: 2024-04-10

## 2024-04-10 NOTE — PATIENT INSTRUCTIONS
Use the eyedrops as directed  Cool compress to the eye  If your eye becomes red swollen and warm he need to come back  Wash your hands frequently  If your left eye becomes infected you can use the eyedrops and that I also

## 2024-04-10 NOTE — PROGRESS NOTES
[facility]        NAME: Michael Mckenzie is a 73 y.o. male  : 1950    MRN: [id number]  DATE: April 10, 2024  TIME: 12:13 PM    Assessment and Plan   Acute bacterial conjunctivitis of right eye [H10.31]  1. Acute bacterial conjunctivitis of right eye  ofloxacin (OCUFLOX) 0.3 % ophthalmic solution            Patient Instructions   Use the eyedrops as directed  Cool compress to the eye  If your eye becomes red swollen and warm he need to come back  Wash your hands frequently  If your left eye becomes infected you can use the eyedrops and that I also  Follow up with PCP in 3-5 days.  Proceed to  ER if symptoms worsen.    If tests have been performed at [facility], our office will contact you with results if changes need to be made to the care plan discussed with you at the visit.  [facility]    Chief Complaint     Chief Complaint   Patient presents with    Eye Problem     X1 right eye discharge, pain, red, ichy         History of Present Illness       This is a 73-year-old male who presents today with irritation and itching and drainage from the right eye.  He states the symptoms started yesterday.  His granddaughter had pinkeye last week.  He denies any fever or chills redness or swelling of the external eye    Eye Problem   Associated symptoms include an eye discharge, eye redness and itching.       Review of Systems   Review of Systems   Constitutional: Negative.    HENT: Negative.     Eyes:  Positive for pain, discharge, redness and itching.   Respiratory: Negative.     Cardiovascular: Negative.    Gastrointestinal: Negative.    Genitourinary: Negative.    Musculoskeletal: Negative.          Current Medications       Current Outpatient Medications:     Cholecalciferol (Vitamin D) 50 MCG (2000) tablet, Take 2,000 Units by mouth daily, Disp: , Rfl:     losartan (COZAAR) 100 MG tablet, Take 1 tablet (100 mg total) by mouth daily, Disp: 30 tablet, Rfl: 5     "ofloxacin (OCUFLOX) 0.3 % ophthalmic solution, Administer 1 drop to the right eye 4 (four) times a day, Disp: 5 mL, Rfl: 0    Current Allergies     Allergies as of 04/10/2024    (No Known Allergies)            The following portions of the patient's history were reviewed and updated as appropriate: allergies, current medications, past family history, past medical history, past social history, past surgical history and problem list.     Past Medical History:   Diagnosis Date    Colon polyp     Enlarged prostate     HL (hearing loss) 2022    Hypertension     Spontaneous pneumothorax     When 21 years old       Past Surgical History:   Procedure Laterality Date    COLONOSCOPY      LAMINECTOMY      TONSILLECTOMY      VASECTOMY      per patient, [id number]       Family History   Problem Relation Age of Onset    Prostate cancer Father     Hypertension Father     Cancer Father         erminal prostate cancer         Medications have been verified.        Objective   /80   Pulse (!) 52   Temp (!) 97.1 °F (36.2 °C)   Resp 18   Ht 5' 8\" (1.727 m)   Wt 82.1 kg (181 lb)   SpO2 98%   BMI 27.52 kg/m²   No LMP for male patient.       Physical Exam     Physical Exam  Vitals reviewed.   Constitutional:       General: He is not in acute distress.     Appearance: Normal appearance.   HENT:      Head: Normocephalic and atraumatic.      Right Ear: Tympanic membrane and ear canal normal.      Left Ear: Tympanic membrane and ear canal normal.      Mouth/Throat:      Mouth: Mucous membranes are moist.      Pharynx: No oropharyngeal exudate or posterior oropharyngeal erythema.   Eyes:      General:         Right eye: Discharge present.      Extraocular Movements: Extraocular movements intact.      Conjunctiva/sclera: Conjunctivae normal.      Pupils: Pupils are equal, round, and reactive to light.      Comments: R eye red, itchy, and discharge   Cardiovascular:      Rate and Rhythm: Normal rate and regular rhythm.      Pulses: " Normal pulses.      Heart sounds: Normal heart sounds. No murmur heard.  Pulmonary:      Effort: Pulmonary effort is normal. No respiratory distress.      Breath sounds: Normal breath sounds.   Abdominal:      General: Abdomen is flat. Bowel sounds are normal. There is no distension.      Palpations: Abdomen is soft.      Tenderness: There is no abdominal tenderness. There is no guarding or rebound.   Musculoskeletal:         General: No swelling or tenderness. Normal range of motion.      Cervical back: Normal range of motion and neck supple. No tenderness.   Skin:     General: Skin is warm.   Neurological:      General: No focal deficit present.      Mental Status: He is alert.   Psychiatric:         Mood and Affect: Mood normal.         Behavior: Behavior normal.         Judgment: Judgment normal.

## 2024-05-07 ENCOUNTER — APPOINTMENT (OUTPATIENT)
Dept: LAB | Facility: CLINIC | Age: 74
End: 2024-05-07
Payer: MEDICARE

## 2024-05-07 DIAGNOSIS — R73.03 PREDIABETES: ICD-10-CM

## 2024-05-07 DIAGNOSIS — E55.9 VITAMIN D DEFICIENCY: ICD-10-CM

## 2024-05-07 DIAGNOSIS — Z12.5 PROSTATE CANCER SCREENING: ICD-10-CM

## 2024-05-07 DIAGNOSIS — E78.5 HYPERLIPIDEMIA, UNSPECIFIED HYPERLIPIDEMIA TYPE: ICD-10-CM

## 2024-05-07 LAB
25(OH)D3 SERPL-MCNC: 36.4 NG/ML (ref 30–100)
ANION GAP SERPL CALCULATED.3IONS-SCNC: 4 MMOL/L (ref 4–13)
BASOPHILS # BLD AUTO: 0.03 THOUSANDS/ÂΜL (ref 0–0.1)
BASOPHILS NFR BLD AUTO: 1 % (ref 0–1)
BUN SERPL-MCNC: 24 MG/DL (ref 5–25)
CALCIUM SERPL-MCNC: 9.2 MG/DL (ref 8.4–10.2)
CHLORIDE SERPL-SCNC: 109 MMOL/L (ref 96–108)
CHOLEST SERPL-MCNC: 175 MG/DL
CO2 SERPL-SCNC: 26 MMOL/L (ref 21–32)
CREAT SERPL-MCNC: 0.9 MG/DL (ref 0.6–1.3)
EOSINOPHIL # BLD AUTO: 0.15 THOUSAND/ÂΜL (ref 0–0.61)
EOSINOPHIL NFR BLD AUTO: 3 % (ref 0–6)
ERYTHROCYTE [DISTWIDTH] IN BLOOD BY AUTOMATED COUNT: 12.6 % (ref 11.6–15.1)
EST. AVERAGE GLUCOSE BLD GHB EST-MCNC: 126 MG/DL
GFR SERPL CREATININE-BSD FRML MDRD: 85 ML/MIN/1.73SQ M
GLUCOSE P FAST SERPL-MCNC: 90 MG/DL (ref 65–99)
HBA1C MFR BLD: 6 %
HCT VFR BLD AUTO: 42.8 % (ref 36.5–49.3)
HDLC SERPL-MCNC: 37 MG/DL
HGB BLD-MCNC: 13.7 G/DL (ref 12–17)
IMM GRANULOCYTES # BLD AUTO: 0.01 THOUSAND/UL (ref 0–0.2)
IMM GRANULOCYTES NFR BLD AUTO: 0 % (ref 0–2)
LDLC SERPL CALC-MCNC: 123 MG/DL (ref 0–100)
LYMPHOCYTES # BLD AUTO: 1.56 THOUSANDS/ÂΜL (ref 0.6–4.47)
LYMPHOCYTES NFR BLD AUTO: 34 % (ref 14–44)
MCH RBC QN AUTO: 30.8 PG (ref 26.8–34.3)
MCHC RBC AUTO-ENTMCNC: 32 G/DL (ref 31.4–37.4)
MCV RBC AUTO: 96 FL (ref 82–98)
MONOCYTES # BLD AUTO: 0.49 THOUSAND/ÂΜL (ref 0.17–1.22)
MONOCYTES NFR BLD AUTO: 11 % (ref 4–12)
NEUTROPHILS # BLD AUTO: 2.33 THOUSANDS/ÂΜL (ref 1.85–7.62)
NEUTS SEG NFR BLD AUTO: 51 % (ref 43–75)
NONHDLC SERPL-MCNC: 138 MG/DL
NRBC BLD AUTO-RTO: 0 /100 WBCS
PLATELET # BLD AUTO: 227 THOUSANDS/UL (ref 149–390)
PMV BLD AUTO: 9.5 FL (ref 8.9–12.7)
POTASSIUM SERPL-SCNC: 4.9 MMOL/L (ref 3.5–5.3)
PSA SERPL-MCNC: 1.15 NG/ML (ref 0–4)
RBC # BLD AUTO: 4.45 MILLION/UL (ref 3.88–5.62)
SODIUM SERPL-SCNC: 139 MMOL/L (ref 135–147)
TRIGL SERPL-MCNC: 77 MG/DL
WBC # BLD AUTO: 4.57 THOUSAND/UL (ref 4.31–10.16)

## 2024-05-07 PROCEDURE — 85025 COMPLETE CBC W/AUTO DIFF WBC: CPT

## 2024-05-07 PROCEDURE — 36415 COLL VENOUS BLD VENIPUNCTURE: CPT

## 2024-05-07 PROCEDURE — 82306 VITAMIN D 25 HYDROXY: CPT

## 2024-05-07 PROCEDURE — 83036 HEMOGLOBIN GLYCOSYLATED A1C: CPT

## 2024-05-07 PROCEDURE — 80048 BASIC METABOLIC PNL TOTAL CA: CPT

## 2024-05-07 PROCEDURE — 80061 LIPID PANEL: CPT

## 2024-05-07 PROCEDURE — G0103 PSA SCREENING: HCPCS

## 2024-05-10 PROBLEM — H10.31 ACUTE BACTERIAL CONJUNCTIVITIS OF RIGHT EYE: Status: RESOLVED | Noted: 2024-04-10 | Resolved: 2024-05-10

## 2024-05-28 NOTE — PROGRESS NOTES
"Ambulatory Visit  Name: Michael Mckenzie      : 1950      MRN: [id number]  Encounter Provider: Annmarie Bowden MD  Encounter Date: 6/3/2024   [facility]    Assessment & Plan   1. Hyperlipidemia, unspecified hyperlipidemia type  Assessment & Plan:  Reviewed labs  ASCVD risk elevated  Discussed starting meds vs. Life style modification  Pt would like to continue life style modification now recheck in 6 months  Orders:  -     Lipid Panel With Direct LDL; Future  -     Hemoglobin A1C; Future  2. Prediabetes  Assessment & Plan:  Reviewed labs  Continue healthy diet and regular exercise  Orders:  -     Lipid Panel With Direct LDL; Future  -     Hemoglobin A1C; Future  3. Overactive bladder  Assessment & Plan:  Had CT and cystoscopy done   Reviewed reports, an likely exposed prostate capsular tab from previous procedure.   F/u with urology  Daily dysuria and urgency symptoms during daytime, not at night time  monitor    4. Primary hypertension  Assessment & Plan:  Under control  Continue current med       History of Present Illness     HPI  New symptoms of Overactive bladder  Dysuria  Pain burning with urination  Review of Systems    Objective     /76 (BP Location: Right arm, Patient Position: Sitting, Cuff Size: Standard)   Pulse 67   Ht 5' 8\" (1.727 m)   Wt 79.8 kg (176 lb)   SpO2 95%   BMI 26.76 kg/m²     Physical Exam  Administrative Statements           "

## 2024-06-03 ENCOUNTER — OFFICE VISIT (OUTPATIENT)
Dept: INTERNAL MEDICINE CLINIC | Facility: CLINIC | Age: 74
End: 2024-06-03
Payer: MEDICARE

## 2024-06-03 VITALS
DIASTOLIC BLOOD PRESSURE: 76 MMHG | OXYGEN SATURATION: 95 % | HEIGHT: 68 IN | SYSTOLIC BLOOD PRESSURE: 132 MMHG | WEIGHT: 176 LBS | BODY MASS INDEX: 26.67 KG/M2 | HEART RATE: 67 BPM

## 2024-06-03 DIAGNOSIS — I10 PRIMARY HYPERTENSION: ICD-10-CM

## 2024-06-03 DIAGNOSIS — E78.5 HYPERLIPIDEMIA, UNSPECIFIED HYPERLIPIDEMIA TYPE: Primary | ICD-10-CM

## 2024-06-03 DIAGNOSIS — R73.03 PREDIABETES: ICD-10-CM

## 2024-06-03 DIAGNOSIS — N32.81 OVERACTIVE BLADDER: ICD-10-CM

## 2024-06-03 PROBLEM — I70.0 ATHEROSCLEROSIS OF ABDOMINAL AORTA (HCC): Status: RESOLVED | Noted: 2022-11-30 | Resolved: 2024-06-03

## 2024-06-03 PROCEDURE — G2211 COMPLEX E/M VISIT ADD ON: HCPCS | Performed by: GENERAL ACUTE CARE HOSPITAL

## 2024-06-03 PROCEDURE — 99214 OFFICE O/P EST MOD 30 MIN: CPT | Performed by: GENERAL ACUTE CARE HOSPITAL

## 2024-06-03 NOTE — ASSESSMENT & PLAN NOTE
Reviewed labs  ASCVD risk elevated  Discussed starting meds vs. Life style modification  Pt would like to continue life style modification now recheck in 6 months

## 2024-06-03 NOTE — TELEPHONE ENCOUNTER
Pt requesting that script be changed to a 90 day supply.     Medication Refill Request     Name Losartan 100mg   Dose/Frequency Take 100 mg PO daily   Quantity 90  Verified pharmacy   [x]  Verified ordering Provider   [x]  Does patient have enough for the next 3 days? Yes [] No [x]

## 2024-06-03 NOTE — ASSESSMENT & PLAN NOTE
Had CT and cystoscopy done 2024  Reviewed reports, an likely exposed prostate capsular tab from previous procedure.   F/u with urology  Daily dysuria and urgency symptoms during daytime, not at night time  monitor

## 2024-06-04 RX ORDER — LOSARTAN POTASSIUM 100 MG/1
100 TABLET ORAL DAILY
Qty: 90 TABLET | Refills: 2 | Status: SHIPPED | OUTPATIENT
Start: 2024-06-04

## 2024-06-10 ENCOUNTER — TELEPHONE (OUTPATIENT)
Age: 74
End: 2024-06-10

## 2024-06-10 NOTE — TELEPHONE ENCOUNTER
PT called and stated for about 4-6 weeks he has been having issues with OAB, dysuria gross hematuria and clots on occasion. Please review when pt can be seen due to the current symptoms. Pt denies any fevers or chills     Pt call back-[phone]

## 2024-06-11 NOTE — TELEPHONE ENCOUNTER
Patient seen in April by Dr. Dixon for the following -    1.  Refractory BPH with lower urinary tract symptoms  -s/p urolift  6/18/2021 (Dr [place], [place])  -Exposed hardware was present on cystoscopy today within the prostatic fossa and within the bladder neck on retroflexion.  Patient is surprisingly asymptomatic and we discussed observation     2. Persistent gross hematuria since the procedure  - outside cystoscopy (records reviewed) describe hypervascularity within prostatic fossa  - has ultrasound for upper tract imaging, but no axial imaging     3.  Recent UTI  -Urine was not submitted for culture     4.  Familial history of aggressive prostate cancer  Prostate cancer screening  -PSA 0.5 March 2023        Please review and advise if he should just go for urine testing or if he should be seen

## 2024-07-22 ENCOUNTER — APPOINTMENT (OUTPATIENT)
Dept: RADIOLOGY | Age: 74
End: 2024-07-22
Payer: MEDICARE

## 2024-07-22 ENCOUNTER — TELEPHONE (OUTPATIENT)
Age: 74
End: 2024-07-22

## 2024-07-22 ENCOUNTER — OFFICE VISIT (OUTPATIENT)
Dept: URGENT CARE | Age: 74
End: 2024-07-22
Payer: MEDICARE

## 2024-07-22 VITALS
SYSTOLIC BLOOD PRESSURE: 114 MMHG | WEIGHT: 171 LBS | RESPIRATION RATE: 18 BRPM | OXYGEN SATURATION: 95 % | BODY MASS INDEX: 25.91 KG/M2 | DIASTOLIC BLOOD PRESSURE: 70 MMHG | HEART RATE: 51 BPM | HEIGHT: 68 IN

## 2024-07-22 DIAGNOSIS — L08.9 INFECTED SEBACEOUS CYST OF SKIN: ICD-10-CM

## 2024-07-22 DIAGNOSIS — S90.852A SPLINTER OF LEFT FOOT, INITIAL ENCOUNTER: Primary | ICD-10-CM

## 2024-07-22 DIAGNOSIS — L72.3 INFECTED SEBACEOUS CYST OF SKIN: ICD-10-CM

## 2024-07-22 DIAGNOSIS — S90.852A SPLINTER OF LEFT FOOT, INITIAL ENCOUNTER: ICD-10-CM

## 2024-07-22 PROCEDURE — 99214 OFFICE O/P EST MOD 30 MIN: CPT | Performed by: STUDENT IN AN ORGANIZED HEALTH CARE EDUCATION/TRAINING PROGRAM

## 2024-07-22 PROCEDURE — 73630 X-RAY EXAM OF FOOT: CPT

## 2024-07-22 PROCEDURE — G0463 HOSPITAL OUTPT CLINIC VISIT: HCPCS | Performed by: STUDENT IN AN ORGANIZED HEALTH CARE EDUCATION/TRAINING PROGRAM

## 2024-07-22 RX ORDER — DOXYCYCLINE 100 MG/1
100 CAPSULE ORAL 2 TIMES DAILY
Qty: 14 CAPSULE | Refills: 0 | Status: SHIPPED | OUTPATIENT
Start: 2024-07-22 | End: 2024-07-29

## 2024-07-22 NOTE — TELEPHONE ENCOUNTER
Caller: Michael Mckenzie    Doctor: Daphne Trevino    Reason for call: Michael has a foreign object in his left foot.  Can we do a force on so he can be seen?  Thank you.     Call back#: [phone]

## 2024-07-22 NOTE — PATIENT INSTRUCTIONS
I am prescribing an antibiotic for the infected cyst on your back.  This is known as a sebaceous cyst.  Please keep the area clean washing with soap and water at least twice daily.  I recommend applying a clean and dry dressing, you may continue to have some drainage, if the dressing becomes wet or dirty, please change to a new 1.  Keep your appointment with your dermatologist.    We took an x-ray of your left foot.  It appears as though there may be a splinter there, I am providing you with a referral to the podiatrist to discuss next steps.    If you have worsening of your symptoms or severe symptoms despite being on antibiotics such as fevers, chills, pain, increasing drainage, please go to the ER.

## 2024-07-22 NOTE — PROGRESS NOTES
[facility]        NAME: Michael Mckenzie is a 74 y.o. male  : 1950    MRN: [id number]  DATE: 2024  TIME: 12:22 PM    Assessment and Plan   Splinter of left foot, initial encounter [S90.852A]  1. Splinter of left foot, initial encounter  XR foot 3+ vw left    Ambulatory Referral to Podiatry      2. Infected sebaceous cyst of skin  doxycycline monohydrate (MONODOX) 100 mg capsule      On physical exam, possible that there is a splinter inside the left foot, however this is already healed over.  I do not see a radiopaque area on his x-ray, pending radiology read.  Referred to podiatry.    Some expression of sebaceous contents, area cleaned with wound cleansing spray and bacitracin applied with clean and dry dressing.  Small area of redness around the cyst, will treat with doxycycline, he will follow-up with dermatology as scheduled.      Patient Instructions   I am prescribing an antibiotic for the infected cyst on your back.  This is known as a sebaceous cyst.  Please keep the area clean washing with soap and water at least twice daily.  I recommend applying a clean and dry dressing, you may continue to have some drainage, if the dressing becomes wet or dirty, please change to a new 1.  Keep your appointment with your dermatologist.    We took an x-ray of your left foot.  It appears as though there may be a splinter there, I am providing you with a referral to the podiatrist to discuss next steps.    If you have worsening of your symptoms or severe symptoms despite being on antibiotics such as fevers, chills, pain, increasing drainage, please go to the ER.    Follow up with PCP in 3-5 days.  Proceed to  ER if symptoms worsen.    If tests have been performed at [facility], our office will contact you with results if changes need to be made to the care plan discussed with you at the visit.  You can review your full results on [facility].    Chief Complaint     Chief Complaint   Patient  "presents with    Foreign Body in Skin     Pt presents with foreign body in bottom of left foot. Open abscess to mid back, draining pus.          History of Present Illness       Patient presents for 2 concerns.  First, he had an area of pain on his bottom of his left foot starting a few weeks ago.  I first improved, however then it continued to be painful and when he looked in the bottom it looked as though there may be a splinter inside.  No significant new swelling, drainage, redness.  He is also concerned for possible abscess on his mid back.  He had a \"blackhead\" type area that was there for some time, he is already scheduled with dermatology over the next couple weeks.  However, his wife squeezed the area and something came out, has been draining since.        Review of Systems   Review of Systems   All other systems reviewed and are negative.        Current Medications       Current Outpatient Medications:     Cholecalciferol (Vitamin D) 50 MCG (2000 UT) tablet, Take 2,000 Units by mouth daily, Disp: , Rfl:     doxycycline monohydrate (MONODOX) 100 mg capsule, Take 1 capsule (100 mg total) by mouth 2 (two) times a day for 7 days, Disp: 14 capsule, Rfl: 0    losartan (COZAAR) 100 MG tablet, Take 1 tablet (100 mg total) by mouth daily, Disp: 90 tablet, Rfl: 2    Current Allergies     Allergies as of 07/22/2024    (No Known Allergies)            The following portions of the patient's history were reviewed and updated as appropriate: allergies, current medications, past family history, past medical history, past social history, past surgical history and problem list.     Past Medical History:   Diagnosis Date    Colon polyp     Enlarged prostate     HL (hearing loss) 2022    Hypertension     Spontaneous pneumothorax     When 21 years old       Past Surgical History:   Procedure Laterality Date    COLONOSCOPY      LAMINECTOMY      TONSILLECTOMY      VASECTOMY      per patient, [id number]       Family History " "  Problem Relation Age of Onset    Prostate cancer Father     Hypertension Father     Cancer Father         erminal prostate cancer         Medications have been verified.        Objective   /70   Pulse (!) 51   Resp 18   Ht 5' 8\" (1.727 m)   Wt 77.6 kg (171 lb)   SpO2 95%   BMI 26.00 kg/m²   No LMP for male patient.       Physical Exam     Physical Exam  Vitals and nursing note reviewed.   Constitutional:       Appearance: He is not ill-appearing, toxic-appearing or diaphoretic.   HENT:      Head: Normocephalic and atraumatic.      Right Ear: External ear normal.      Left Ear: External ear normal.      Nose: Nose normal.   Eyes:      Extraocular Movements: Extraocular movements intact.      Conjunctiva/sclera: Conjunctivae normal.   Musculoskeletal:      Comments: Central midline back with sebaceous cyst, 1 area open, draining serosanguineous fluid, 1 area still with plug.  Small area of surrounding erythema.  Further sebaceous contents expressed with palpation.      Ball of left foot with no open skin, there is a 2 mm area of darkness, possible splinter inside     Skin:     General: Skin is warm and dry.      Findings: No rash.   Neurological:      Mental Status: He is alert.   Psychiatric:         Mood and Affect: Mood normal.                     "

## 2024-07-23 NOTE — TELEPHONE ENCOUNTER
Caller: Patient     Doctor: Daphne     Reason for call: Patient returning call to schedule     Call back#:  [phone]

## 2024-07-24 ENCOUNTER — OFFICE VISIT (OUTPATIENT)
Dept: PODIATRY | Facility: CLINIC | Age: 74
End: 2024-07-24
Payer: MEDICARE

## 2024-07-24 VITALS — HEART RATE: 46 BPM | DIASTOLIC BLOOD PRESSURE: 73 MMHG | SYSTOLIC BLOOD PRESSURE: 117 MMHG

## 2024-07-24 DIAGNOSIS — L02.612 ABSCESS OF LEFT FOOT: ICD-10-CM

## 2024-07-24 DIAGNOSIS — S90.852A FOREIGN BODY IN LEFT FOOT, INITIAL ENCOUNTER: Primary | ICD-10-CM

## 2024-07-24 PROCEDURE — 99203 OFFICE O/P NEW LOW 30 MIN: CPT | Performed by: PODIATRIST

## 2024-07-24 PROCEDURE — 28190 REMOVAL OF FOOT FOREIGN BODY: CPT | Performed by: PODIATRIST

## 2024-07-24 NOTE — PROGRESS NOTES
Ambulatory Visit  Name: Michael Mckenzie      : 1950      MRN: [id number]  Encounter Provider: Matias Serna DPM  Encounter Date: 2024   Encounter department: [facility] [place]    Concur with patient that there appears to be a foreign body in the left foot.  Without need of anesthesia, debridement with a #15 blade performed.  Foreign body was removed along with the callus and there was significant creamy drainage typical of staph infection.  There is a small open area following this debridement and removal of the foreign body.  Bacitracin dressing applied.  No pain with palpation to the area once this abscess and foreign body was removed and drained.  Patient told to complete his oral antibiotics.  He should apply Neosporin to the wound on the bottom of the left foot until healed.  He will contact me should pain persist over the next few weeks or recurrence occurs    Assessment & Plan   1. Foreign body in left foot, initial encounter  2. Abscess of left foot      History of Present Illness     Michael Mckenzie is a 74 y.o. male who presents with a painful lesion on the bottom of the left foot.  Patient has been in pain for approximately 3 to 4 weeks.  There is a callus with an apparent embedded splinter in the region of the fourth metatarsal head of the left foot.  Patient does not recall stepping on a foreign body however.  Patient was seen at an urgent care center on 2024.  In addition to the foot issue, patient also had an abscess in his back.    The patient was placed on doxycycline.  In regards to the foot, x-rays were taken and read as negative for foreign body.    I personally reviewed the note at the urgent care center dated 2024 confirming oral antibiotics given to patient as well as care provided.    Review of Systems   Gastrointestinal: Negative.    Musculoskeletal:  Positive for gait problem.   Psychiatric/Behavioral: Negative.             Objective     BP  117/73 (BP Location: Left arm, Patient Position: Sitting, Cuff Size: Standard)   Pulse (!) 46     Physical Exam  Constitutional:       Appearance: Normal appearance.   Cardiovascular:      Pulses: Normal pulses.   Musculoskeletal:         General: Normal range of motion.   Skin:     Findings: Lesion present.      Comments: Hyperkeratotic lesion plantar aspect left foot at the fourth metatarsal head that is painful with direct pressure.  Within the callus there is a sliver that appears to be a wood splinter.   Neurological:      General: No focal deficit present.      Mental Status: He is oriented to person, place, and time.           Administrative Statements         Universal Protocol:  Consent: Verbal consent obtained.  Risks and benefits: risks, benefits and alternatives were discussed  Consent given by: patient  Patient identity confirmed: verbally with patient  Foreign body removal    Date/Time: 7/24/2024 11:00 AM    Performed by: Matias Serna DPM  Authorized by: Mtaias Serna DPM  Body area: skin  General location: lower extremity  Location details: left foot  Complexity: simple  Post-procedure assessment: foreign body removed  Patient tolerance: patient tolerated the procedure well with no immediate complications

## 2024-08-21 ENCOUNTER — OFFICE VISIT (OUTPATIENT)
Dept: UROLOGY | Facility: CLINIC | Age: 74
End: 2024-08-21
Payer: MEDICARE

## 2024-08-21 VITALS
WEIGHT: 171 LBS | HEIGHT: 68 IN | BODY MASS INDEX: 25.91 KG/M2 | SYSTOLIC BLOOD PRESSURE: 108 MMHG | HEART RATE: 52 BPM | DIASTOLIC BLOOD PRESSURE: 60 MMHG | OXYGEN SATURATION: 97 %

## 2024-08-21 DIAGNOSIS — N40.1 BENIGN PROSTATIC HYPERPLASIA WITH URINARY FREQUENCY: Primary | ICD-10-CM

## 2024-08-21 DIAGNOSIS — R35.0 BENIGN PROSTATIC HYPERPLASIA WITH URINARY FREQUENCY: Primary | ICD-10-CM

## 2024-08-21 PROCEDURE — 99214 OFFICE O/P EST MOD 30 MIN: CPT | Performed by: UROLOGY

## 2024-08-21 NOTE — PROGRESS NOTES
Referring Physician: Annmarie Bowden MD  A copy of this note was sent to the referring physician.       Diagnoses and all orders for this visit:    Benign prostatic hyperplasia with urinary frequency  -     Case request operating room: TRANSURETHRAL RESECTION OF PROSTATE (TURP), cystolithalopaxy w holmium laser, removal of foreign body (urolift implants); Standing  -     Case request operating room: TRANSURETHRAL RESECTION OF PROSTATE (TURP), cystolithalopaxy w holmium laser, removal of foreign body (urolift implants)    Other orders  -     Place sequential compression device; Standing  -     ceFAZolin (ANCEF) 1,000 mg in sodium chloride 0.9 % 50 mL IVPB              Assessment and plan:       1.  Refractory BPH with lower urinary tract symptoms  -s/p urolift  6/18/2021 ([facility])  -Exposed hardware was present on cystoscopy today within the prostatic fossa and within the bladder neck on retroflexion.  Bladder neck implant has a sizable calculus.      2. Persistent gross hematuria since the procedure  - outside cystoscopy (records reviewed) describe hypervascularity within prostatic fossa  - has ultrasound for upper tract imaging, but no axial imaging    3.  Recent UTI  -Urine was not submitted for culture    4.  Familial history of aggressive prostate cancer  Prostate cancer screening  -PSA 0.5 March 2023    #5 men's health, General preventative care  - Hemoglobin A1c 5.8  - BMI 26  -LDL of 93 as noted    Michael has gotten increasingly symptomatic over the past few months with worsening urgency frequency dysuria and intermittent gross hematuria    I reviewed my prior cystoscopy note and images with the patient and his wife and recommended a combination cystolitholapaxy along with a targeted TURP and removal of all necessary exposed prostatic urethral implants.      We reviewed the options for treating BPH/LUTS which include but are not limited to expectant management, medical therapy, minimally invasive options  such as thermotherapy or interstitial laser therapy, transurethral resection of prostate (TURP), open prostatectomy, etc. At this point, the patient wishes to proceed with TURP. The risks of TURP include but are not limited to bleeding, infection, reaction to anesthesia such as heart attack, stroke, DVT/PE, hyponatremia, bladder neck contracture, urethral stricture, injury to surrounding structures (ureters, rectum, etc) incontinence, retrograde ejaculation, erectile dysfunction, prostatic regrowth or the need for further therapy. I also told him that the tissue resected and sent for pathological analysis could reveal prostate cancer and the implications were discussed. His questions regarding surgery, the possible complications and alternatives to therapy were answered to his satisfaction. Finally, I told him that he may require additional procedures secondary to some of these complications. Informed consent was then obtained.  He will be scheduled for a TURP with cystolitholapaxy and removal of UroLift implants at the earliest convenience.    Pal Dixon MD      Chief Complaint     BPH follow-up      History of Present Illness     Michael Mckenzie is a 74 y.o. returns in follow-up for BPH    Detailed Urologic History     - please refer to HPI    Review of Systems     Review of Systems   Constitutional: Negative for activity change and fatigue.   HENT: Negative for congestion.    Eyes: Negative for visual disturbance.   Respiratory: Negative for shortness of breath and wheezing.    Cardiovascular: Negative for chest pain and leg swelling.   Gastrointestinal: Negative for abdominal pain.   Endocrine: Negative for polyuria.   Genitourinary: Negative for dysuria, flank pain, hematuria and urgency.   Musculoskeletal: Negative for back pain.   Allergic/Immunologic: Negative for immunocompromised state.   Neurological: Negative for dizziness and numbness.   Psychiatric/Behavioral: Negative for dysphoric mood.  "  All other systems reviewed and are negative.      AUA SYMPTOM SCORE      Flowsheet Row Most Recent Value   AUA SYMPTOM SCORE    How often have you had a sensation of not emptying your bladder completely after you finished urinating? 1 (P)     How often have you had to urinate again less than two hours after you finished urinating? 3 (P)     How often have you found you stopped and started again several times when you urinate? 1 (P)     How often have you found it difficult to postpone urination? 4 (P)     How often have you had a weak urinary stream? 2 (P)     How often have you had to push or strain to begin urination? 1 (P)     How many times did you most typically get up to urinate from the time you went to bed at night until the time you got up in the morning? 2 (P)     Quality of Life: If you were to spend the rest of your life with your urinary condition just the way it is now, how would you feel about that? 3 (P)     AUA SYMPTOM SCORE 14 (P)               Allergies     No Known Allergies    Physical Exam       Physical Exam  Constitutional:       General: He is not in acute distress.     Appearance: He is well-developed.   HENT:      Head: Normocephalic and atraumatic.   Cardiovascular:      Comments: Negative lower extremity edema  Pulmonary:      Effort: Pulmonary effort is normal.      Breath sounds: Normal breath sounds.   Abdominal:      Palpations: Abdomen is soft.   Musculoskeletal:         General: Normal range of motion.      Cervical back: Normal range of motion.   Skin:     General: Skin is warm.   Neurological:      Mental Status: He is alert and oriented to person, place, and time.   Psychiatric:         Behavior: Behavior normal.           Vital Signs  Vitals:    08/21/24 1029   BP: 108/60   BP Location: Left arm   Patient Position: Sitting   Cuff Size: Adult   Pulse: (!) 52   SpO2: 97%   Weight: 77.6 kg (171 lb)   Height: 5' 8\" (1.727 m)         Current Medications       Current Outpatient " "Medications:     Cholecalciferol (Vitamin D) 50 MCG ( UT) tablet, Take 2,000 Units by mouth daily, Disp: , Rfl:     losartan (COZAAR) 100 MG tablet, Take 1 tablet (100 mg total) by mouth daily, Disp: 90 tablet, Rfl: 2      Active Problems     Patient Active Problem List   Diagnosis    Skin lesion    Decreased hearing of both ears    Primary hypertension    Benign prostatic hyperplasia without lower urinary tract symptoms    Osteopenia    Numbness of right foot    Injury of right foot    Primary insomnia    Hyperlipidemia    Prediabetes    Overactive bladder         Past Medical History     Past Medical History:   Diagnosis Date    Colon polyp     Enlarged prostate     HL (hearing loss)     Hypertension     Spontaneous pneumothorax     When 21 years old         Surgical History     Past Surgical History:   Procedure Laterality Date    COLONOSCOPY      LAMINECTOMY      TONSILLECTOMY      VASECTOMY      per patient, [id number]         Family History     Family History   Problem Relation Age of Onset    Prostate cancer Father     Hypertension Father     Cancer Father         erminal prostate cancer         Social History     Social History     Social History     Tobacco Use   Smoking Status Former    Current packs/day: 0.00    Average packs/day: 1 pack/day for 15.0 years (15.0 ttl pk-yrs)    Types: Cigarettes    Start date: 1968    Quit date: 1982    Years since quittin.6    Passive exposure: Past   Smokeless Tobacco Never   Tobacco Comments    1ppd age [age], then [age], then quit         Pertinent Lab Values     Lab Results   Component Value Date    CREATININE 0.90 2024       Lab Results   Component Value Date    PSA 1.15 2024    PSA 0.5 03/15/2023       @RESULTRCNT(1H])@      Pertinent Imaging       No results found.      Portions of the record may have been created with voice recognition software.  Occasional wrong word or \"sound a like\" substitutions may have occurred due to the " inherent limitations of voice recognition software.  In addition some of the content generated from this outpatient encounter includes information designed for patient education and/or communication back to the referring provider.  Read the chart carefully and recognize, using context, where substitutions have occurred.

## 2024-09-04 ENCOUNTER — TELEPHONE (OUTPATIENT)
Dept: UROLOGY | Facility: AMBULATORY SURGERY CENTER | Age: 74
End: 2024-09-04

## 2024-09-04 NOTE — TELEPHONE ENCOUNTER
I called pt this afternoon to discuss scheduling his procedure with Dr. Dixon. I was able to speak with pt and he stated that it was not a good time and wanted to know if I could call back tomorrow. I confirmed that I will call him back to discuss scheduling.

## 2024-09-10 ENCOUNTER — PREP FOR PROCEDURE (OUTPATIENT)
Dept: UROLOGY | Facility: AMBULATORY SURGERY CENTER | Age: 74
End: 2024-09-10

## 2024-09-10 DIAGNOSIS — R35.0 BENIGN PROSTATIC HYPERPLASIA WITH URINARY FREQUENCY: Primary | ICD-10-CM

## 2024-09-10 DIAGNOSIS — R39.89 SUSPECTED UTI: ICD-10-CM

## 2024-09-10 DIAGNOSIS — N40.1 BENIGN PROSTATIC HYPERPLASIA WITH URINARY FREQUENCY: Primary | ICD-10-CM

## 2024-09-10 DIAGNOSIS — Z01.818 PREOP EXAMINATION: ICD-10-CM

## 2024-09-10 DIAGNOSIS — Z01.810 PREOP CARDIOVASCULAR EXAM: ICD-10-CM

## 2024-09-10 DIAGNOSIS — Z01.812 PRE-PROCEDURE LAB EXAM: ICD-10-CM

## 2024-10-23 ENCOUNTER — APPOINTMENT (OUTPATIENT)
Dept: LAB | Facility: CLINIC | Age: 74
End: 2024-10-23
Payer: MEDICARE

## 2024-10-23 DIAGNOSIS — R73.03 DIABETES MELLITUS, LATENT: ICD-10-CM

## 2024-10-23 DIAGNOSIS — R35.0 BENIGN PROSTATIC HYPERPLASIA WITH URINARY FREQUENCY: ICD-10-CM

## 2024-10-23 DIAGNOSIS — Z01.812 PRE-PROCEDURE LAB EXAM: ICD-10-CM

## 2024-10-23 DIAGNOSIS — Z01.810 PREOP CARDIOVASCULAR EXAM: ICD-10-CM

## 2024-10-23 DIAGNOSIS — N40.1 BENIGN PROSTATIC HYPERPLASIA WITH URINARY FREQUENCY: ICD-10-CM

## 2024-10-23 DIAGNOSIS — R39.89 SUSPECTED UTI: ICD-10-CM

## 2024-10-23 DIAGNOSIS — Z01.818 PREOP EXAMINATION: ICD-10-CM

## 2024-10-23 DIAGNOSIS — E78.5 HYPERLIPIDEMIA, UNSPECIFIED HYPERLIPIDEMIA TYPE: Primary | ICD-10-CM

## 2024-10-23 DIAGNOSIS — R73.03 PREDIABETES: ICD-10-CM

## 2024-10-23 LAB
ABO GROUP BLD: NORMAL
ANION GAP SERPL CALCULATED.3IONS-SCNC: 5 MMOL/L (ref 4–13)
ATRIAL RATE: 47 BPM
BASOPHILS # BLD AUTO: 0.02 THOUSANDS/ΜL (ref 0–0.1)
BASOPHILS NFR BLD AUTO: 0 % (ref 0–1)
BLD GP AB SCN SERPL QL: NEGATIVE
BUN SERPL-MCNC: 27 MG/DL (ref 5–25)
CALCIUM SERPL-MCNC: 8.6 MG/DL (ref 8.4–10.2)
CHLORIDE SERPL-SCNC: 112 MMOL/L (ref 96–108)
CO2 SERPL-SCNC: 24 MMOL/L (ref 21–32)
CREAT SERPL-MCNC: 0.94 MG/DL (ref 0.6–1.3)
EOSINOPHIL # BLD AUTO: 0.14 THOUSAND/ΜL (ref 0–0.61)
EOSINOPHIL NFR BLD AUTO: 3 % (ref 0–6)
ERYTHROCYTE [DISTWIDTH] IN BLOOD BY AUTOMATED COUNT: 12.5 % (ref 11.6–15.1)
GFR SERPL CREATININE-BSD FRML MDRD: 80 ML/MIN/1.73SQ M
GLUCOSE P FAST SERPL-MCNC: 86 MG/DL (ref 65–99)
HCT VFR BLD AUTO: 40.3 % (ref 36.5–49.3)
HGB BLD-MCNC: 12.9 G/DL (ref 12–17)
IMM GRANULOCYTES # BLD AUTO: 0.03 THOUSAND/UL (ref 0–0.2)
IMM GRANULOCYTES NFR BLD AUTO: 1 % (ref 0–2)
LYMPHOCYTES # BLD AUTO: 1.47 THOUSANDS/ΜL (ref 0.6–4.47)
LYMPHOCYTES NFR BLD AUTO: 28 % (ref 14–44)
MCH RBC QN AUTO: 30.8 PG (ref 26.8–34.3)
MCHC RBC AUTO-ENTMCNC: 32 G/DL (ref 31.4–37.4)
MCV RBC AUTO: 96 FL (ref 82–98)
MONOCYTES # BLD AUTO: 0.63 THOUSAND/ΜL (ref 0.17–1.22)
MONOCYTES NFR BLD AUTO: 12 % (ref 4–12)
NEUTROPHILS # BLD AUTO: 3 THOUSANDS/ΜL (ref 1.85–7.62)
NEUTS SEG NFR BLD AUTO: 56 % (ref 43–75)
NRBC BLD AUTO-RTO: 0 /100 WBCS
P AXIS: 33 DEGREES
PLATELET # BLD AUTO: 230 THOUSANDS/UL (ref 149–390)
PMV BLD AUTO: 9.7 FL (ref 8.9–12.7)
POTASSIUM SERPL-SCNC: 4.1 MMOL/L (ref 3.5–5.3)
PR INTERVAL: 212 MS
QRS AXIS: -49 DEGREES
QRSD INTERVAL: 124 MS
QT INTERVAL: 460 MS
QTC INTERVAL: 407 MS
RBC # BLD AUTO: 4.19 MILLION/UL (ref 3.88–5.62)
RH BLD: NEGATIVE
SODIUM SERPL-SCNC: 141 MMOL/L (ref 135–147)
SPECIMEN EXPIRATION DATE: NORMAL
T WAVE AXIS: 8 DEGREES
VENTRICULAR RATE: 47 BPM
WBC # BLD AUTO: 5.29 THOUSAND/UL (ref 4.31–10.16)

## 2024-10-23 PROCEDURE — 87086 URINE CULTURE/COLONY COUNT: CPT

## 2024-10-23 PROCEDURE — 86900 BLOOD TYPING SEROLOGIC ABO: CPT

## 2024-10-23 PROCEDURE — 93010 ELECTROCARDIOGRAM REPORT: CPT | Performed by: INTERNAL MEDICINE

## 2024-10-23 PROCEDURE — 93005 ELECTROCARDIOGRAM TRACING: CPT

## 2024-10-23 PROCEDURE — 86901 BLOOD TYPING SEROLOGIC RH(D): CPT

## 2024-10-23 PROCEDURE — 85025 COMPLETE CBC W/AUTO DIFF WBC: CPT

## 2024-10-23 PROCEDURE — 36415 COLL VENOUS BLD VENIPUNCTURE: CPT

## 2024-10-23 PROCEDURE — 86850 RBC ANTIBODY SCREEN: CPT

## 2024-10-23 PROCEDURE — 80048 BASIC METABOLIC PNL TOTAL CA: CPT

## 2024-10-25 LAB
BACTERIA UR CULT: NORMAL
BACTERIA UR CULT: NORMAL

## 2024-10-29 NOTE — PRE-PROCEDURE INSTRUCTIONS
Pre-Surgery Instructions:   Medication Instructions    Cholecalciferol (Vitamin D) 50 MCG (2000 UT) tablet Stop taking 7 days prior to surgery.    losartan (COZAAR) 100 MG tablet Hold day of surgery.    Medication instructions for day surgery reviewed. Please use only a sip of water to take your instructed medications. Avoid all over the counter vitamins, supplements and NSAIDS for one week prior to surgery per anesthesia guidelines. Tylenol is ok to take as needed.     You will receive a call one business day prior to surgery with an arrival time and hospital directions. If your surgery is scheduled on a Monday, the hospital will be calling you on the Friday prior to your surgery. If you have not heard from anyone by 8pm, please call the hospital supervisor through the hospital  at [phone]. ([place] [phone] or [place] [phone]).    Do not eat or drink anything after midnight the night before your surgery, including candy, mints, lifesavers, or chewing gum. Do not drink alcohol 24hrs before your surgery. Try not to smoke at least 24hrs before your surgery.       Follow the pre surgery showering instructions as listed in the “My Surgical Experience Booklet” or otherwise provided by your surgeon's office. Do not use a blade to shave the surgical area 1 week before surgery. It is okay to use a clean electric clippers up to 24 hours before surgery. Do not apply any lotions, creams, including makeup, cologne, deodorant, or perfumes after showering on the day of your surgery. Do not use dry shampoo, hair spray, hair gel, or any type of hair products.     No contact lenses, eye make-up, or artificial eyelashes. Remove nail polish, including gel polish, and any artificial, gel, or acrylic nails if possible. Remove all jewelry including rings and body piercing jewelry.     Wear causal clothing that is easy to take on and off. Consider your type of surgery.    Keep any valuables, jewelry, piercings at  home. Please bring any specially ordered equipment (sling, braces) if indicated.    Arrange for a responsible person to drive you to and from the hospital on the day of your surgery. Please confirm the visitor policy for the day of your procedure when you receive your phone call with an arrival time.     Call the surgeon's office with any new illnesses, exposures, or additional questions prior to surgery.    Please reference your “My Surgical Experience Booklet” for additional information to prepare for your upcoming surgery.

## 2024-11-04 ENCOUNTER — CONSULT (OUTPATIENT)
Dept: INTERNAL MEDICINE CLINIC | Facility: CLINIC | Age: 74
End: 2024-11-04
Payer: MEDICARE

## 2024-11-04 VITALS
BODY MASS INDEX: 26.28 KG/M2 | DIASTOLIC BLOOD PRESSURE: 72 MMHG | OXYGEN SATURATION: 95 % | HEART RATE: 68 BPM | SYSTOLIC BLOOD PRESSURE: 104 MMHG | HEIGHT: 68 IN | WEIGHT: 173.4 LBS

## 2024-11-04 DIAGNOSIS — Z01.818 PRE-OP EXAMINATION: Primary | ICD-10-CM

## 2024-11-04 DIAGNOSIS — I10 PRIMARY HYPERTENSION: ICD-10-CM

## 2024-11-04 PROCEDURE — 99213 OFFICE O/P EST LOW 20 MIN: CPT | Performed by: GENERAL ACUTE CARE HOSPITAL

## 2024-11-04 PROCEDURE — G2211 COMPLEX E/M VISIT ADD ON: HCPCS | Performed by: GENERAL ACUTE CARE HOSPITAL

## 2024-11-04 NOTE — ASSESSMENT & PLAN NOTE
Patient is able to perform 4 METS without any symptoms.  He is medically optimized for his upcoming urology procedure.  No further test needed prior to his surgery.

## 2024-11-04 NOTE — PROGRESS NOTES
"Ambulatory Visit  Name: Michael Mckenzie      : 1950      MRN: [id number]  Encounter Provider: Annmarie Bowden MD  Encounter Date: 2024   Enc[facility]    Assessment & Plan  Pre-op examination  Patient is able to perform 4 METS without any symptoms.  He is medically optimized for his upcoming urology procedure.  No further test needed prior to his surgery.         Primary hypertension  Under control.  Continue losartan.            History of Present Illness     HPI  Pre-op   urologist Dr. Dixon  TRANSURETHRAL RESECTION OF PROSTATE (TURP), cystolithalopaxy w holmium laser, removal of foreign body (urolift implants) (Abdomen)       LITHOLAPAXY HOLMIUM LASER (Ureter)     Review of Systems        Objective     /72   Pulse 68   Ht 5' 8\" (1.727 m)   Wt 78.7 kg (173 lb 6.4 oz)   SpO2 95%   BMI 26.37 kg/m²     Physical Exam    "

## 2024-11-07 ENCOUNTER — HOSPITAL ENCOUNTER (OUTPATIENT)
Facility: HOSPITAL | Age: 74
Setting detail: OUTPATIENT SURGERY
Discharge: HOME | End: 2024-11-08
Attending: UROLOGY | Admitting: UROLOGY
Payer: MEDICARE

## 2024-11-07 ENCOUNTER — ANESTHESIA (OUTPATIENT)
Dept: PERIOP | Facility: HOSPITAL | Age: 74
End: 2024-11-07
Payer: MEDICARE

## 2024-11-07 ENCOUNTER — TELEPHONE (OUTPATIENT)
Dept: UROLOGY | Facility: CLINIC | Age: 74
End: 2024-11-07

## 2024-11-07 ENCOUNTER — ANESTHESIA EVENT (OUTPATIENT)
Dept: PERIOP | Facility: HOSPITAL | Age: 74
End: 2024-11-07
Payer: MEDICARE

## 2024-11-07 DIAGNOSIS — R35.0 BENIGN PROSTATIC HYPERPLASIA WITH URINARY FREQUENCY: Primary | ICD-10-CM

## 2024-11-07 DIAGNOSIS — N40.1 BENIGN PROSTATIC HYPERPLASIA WITH URINARY FREQUENCY: Primary | ICD-10-CM

## 2024-11-07 LAB
ABO GROUP BLD: NORMAL
ANION GAP SERPL CALCULATED.3IONS-SCNC: 7 MMOL/L (ref 4–13)
BUN SERPL-MCNC: 25 MG/DL (ref 5–25)
CALCIUM SERPL-MCNC: 8.7 MG/DL (ref 8.4–10.2)
CHLORIDE SERPL-SCNC: 108 MMOL/L (ref 96–108)
CO2 SERPL-SCNC: 20 MMOL/L (ref 21–32)
CREAT SERPL-MCNC: 0.95 MG/DL (ref 0.6–1.3)
GFR SERPL CREATININE-BSD FRML MDRD: 79 ML/MIN/1.73SQ M
GLUCOSE SERPL-MCNC: 221 MG/DL (ref 65–140)
POTASSIUM SERPL-SCNC: 4.2 MMOL/L (ref 3.5–5.3)
RH BLD: NEGATIVE
SODIUM SERPL-SCNC: 135 MMOL/L (ref 135–147)

## 2024-11-07 PROCEDURE — 88305 TISSUE EXAM BY PATHOLOGIST: CPT | Performed by: PATHOLOGY

## 2024-11-07 PROCEDURE — 52601 PROSTATECTOMY (TURP): CPT | Performed by: UROLOGY

## 2024-11-07 PROCEDURE — 80048 BASIC METABOLIC PNL TOTAL CA: CPT | Performed by: NURSE PRACTITIONER

## 2024-11-07 PROCEDURE — C1758 CATHETER, URETERAL: HCPCS | Performed by: UROLOGY

## 2024-11-07 PROCEDURE — 88341 IMHCHEM/IMCYTCHM EA ADD ANTB: CPT | Performed by: PATHOLOGY

## 2024-11-07 PROCEDURE — 52317 REMOVE BLADDER STONE: CPT | Performed by: UROLOGY

## 2024-11-07 PROCEDURE — NC001 PR NO CHARGE: Performed by: UROLOGY

## 2024-11-07 PROCEDURE — 88342 IMHCHEM/IMCYTCHM 1ST ANTB: CPT | Performed by: PATHOLOGY

## 2024-11-07 PROCEDURE — C1769 GUIDE WIRE: HCPCS | Performed by: UROLOGY

## 2024-11-07 RX ORDER — CEPHALEXIN 500 MG/1
500 CAPSULE ORAL EVERY 6 HOURS SCHEDULED
Qty: 3 CAPSULE | Refills: 0 | Status: SHIPPED | OUTPATIENT
Start: 2024-11-07 | End: 2024-11-08

## 2024-11-07 RX ORDER — OXYCODONE HYDROCHLORIDE 5 MG/1
5 TABLET ORAL EVERY 4 HOURS PRN
Qty: 5 TABLET | Refills: 0 | Status: SHIPPED | OUTPATIENT
Start: 2024-11-07 | End: 2024-11-12

## 2024-11-07 RX ORDER — PROPOFOL 10 MG/ML
INJECTION, EMULSION INTRAVENOUS AS NEEDED
Status: DISCONTINUED | OUTPATIENT
Start: 2024-11-07 | End: 2024-11-07

## 2024-11-07 RX ORDER — DIPHENHYDRAMINE HYDROCHLORIDE 50 MG/ML
12.5 INJECTION INTRAMUSCULAR; INTRAVENOUS ONCE AS NEEDED
Status: DISCONTINUED | OUTPATIENT
Start: 2024-11-07 | End: 2024-11-07 | Stop reason: HOSPADM

## 2024-11-07 RX ORDER — NAPROXEN 500 MG/1
500 TABLET ORAL 2 TIMES DAILY WITH MEALS
Qty: 10 TABLET | Refills: 0 | Status: SHIPPED | OUTPATIENT
Start: 2024-11-07 | End: 2024-11-12

## 2024-11-07 RX ORDER — ONDANSETRON 2 MG/ML
INJECTION INTRAMUSCULAR; INTRAVENOUS AS NEEDED
Status: DISCONTINUED | OUTPATIENT
Start: 2024-11-07 | End: 2024-11-07

## 2024-11-07 RX ORDER — SODIUM CHLORIDE, SODIUM LACTATE, POTASSIUM CHLORIDE, CALCIUM CHLORIDE 600; 310; 30; 20 MG/100ML; MG/100ML; MG/100ML; MG/100ML
100 INJECTION, SOLUTION INTRAVENOUS CONTINUOUS
Status: DISCONTINUED | OUTPATIENT
Start: 2024-11-07 | End: 2024-11-07

## 2024-11-07 RX ORDER — CEFAZOLIN SODIUM 1 G/50ML
1000 SOLUTION INTRAVENOUS ONCE
Status: COMPLETED | OUTPATIENT
Start: 2024-11-07 | End: 2024-11-07

## 2024-11-07 RX ORDER — FENTANYL CITRATE 50 UG/ML
INJECTION, SOLUTION INTRAMUSCULAR; INTRAVENOUS AS NEEDED
Status: DISCONTINUED | OUTPATIENT
Start: 2024-11-07 | End: 2024-11-07

## 2024-11-07 RX ORDER — DOCUSATE SODIUM 100 MG/1
100 CAPSULE, LIQUID FILLED ORAL 2 TIMES DAILY
Qty: 30 CAPSULE | Refills: 0 | Status: SHIPPED | OUTPATIENT
Start: 2024-11-07 | End: 2024-11-22

## 2024-11-07 RX ORDER — MAGNESIUM HYDROXIDE 1200 MG/15ML
LIQUID ORAL AS NEEDED
Status: DISCONTINUED | OUTPATIENT
Start: 2024-11-07 | End: 2024-11-07 | Stop reason: HOSPADM

## 2024-11-07 RX ORDER — OXYCODONE HYDROCHLORIDE 5 MG/1
5 TABLET ORAL EVERY 4 HOURS PRN
Status: DISCONTINUED | OUTPATIENT
Start: 2024-11-07 | End: 2024-11-08 | Stop reason: HOSPADM

## 2024-11-07 RX ORDER — LIDOCAINE HYDROCHLORIDE 10 MG/ML
INJECTION, SOLUTION EPIDURAL; INFILTRATION; INTRACAUDAL; PERINEURAL AS NEEDED
Status: DISCONTINUED | OUTPATIENT
Start: 2024-11-07 | End: 2024-11-07

## 2024-11-07 RX ORDER — ACETAMINOPHEN 325 MG/1
650 TABLET ORAL EVERY 4 HOURS PRN
Start: 2024-11-07

## 2024-11-07 RX ORDER — ENOXAPARIN SODIUM 100 MG/ML
40 INJECTION SUBCUTANEOUS DAILY
Status: DISCONTINUED | OUTPATIENT
Start: 2024-11-08 | End: 2024-11-08 | Stop reason: HOSPADM

## 2024-11-07 RX ORDER — SODIUM CHLORIDE, SODIUM LACTATE, POTASSIUM CHLORIDE, CALCIUM CHLORIDE 600; 310; 30; 20 MG/100ML; MG/100ML; MG/100ML; MG/100ML
INJECTION, SOLUTION INTRAVENOUS CONTINUOUS PRN
Status: DISCONTINUED | OUTPATIENT
Start: 2024-11-07 | End: 2024-11-07

## 2024-11-07 RX ORDER — FENTANYL CITRATE/PF 50 MCG/ML
25 SYRINGE (ML) INJECTION
Status: DISCONTINUED | OUTPATIENT
Start: 2024-11-07 | End: 2024-11-07 | Stop reason: HOSPADM

## 2024-11-07 RX ORDER — EPHEDRINE SULFATE 50 MG/ML
INJECTION INTRAVENOUS AS NEEDED
Status: DISCONTINUED | OUTPATIENT
Start: 2024-11-07 | End: 2024-11-07

## 2024-11-07 RX ORDER — ONDANSETRON 2 MG/ML
4 INJECTION INTRAMUSCULAR; INTRAVENOUS ONCE AS NEEDED
Status: DISCONTINUED | OUTPATIENT
Start: 2024-11-07 | End: 2024-11-07 | Stop reason: HOSPADM

## 2024-11-07 RX ORDER — PHENAZOPYRIDINE HYDROCHLORIDE 200 MG/1
200 TABLET, FILM COATED ORAL 3 TIMES DAILY PRN
Qty: 10 TABLET | Refills: 0 | Status: SHIPPED | OUTPATIENT
Start: 2024-11-07 | End: 2024-11-10

## 2024-11-07 RX ORDER — MAGNESIUM HYDROXIDE 1200 MG/15ML
3000 LIQUID ORAL CONTINUOUS
Status: DISCONTINUED | OUTPATIENT
Start: 2024-11-07 | End: 2024-11-08 | Stop reason: HOSPADM

## 2024-11-07 RX ORDER — LOSARTAN POTASSIUM 50 MG/1
100 TABLET ORAL DAILY
Status: DISCONTINUED | OUTPATIENT
Start: 2024-11-08 | End: 2024-11-08 | Stop reason: HOSPADM

## 2024-11-07 RX ORDER — HYDROMORPHONE HCL/PF 1 MG/ML
0.5 SYRINGE (ML) INJECTION
Status: DISCONTINUED | OUTPATIENT
Start: 2024-11-07 | End: 2024-11-07 | Stop reason: HOSPADM

## 2024-11-07 RX ORDER — DOCUSATE SODIUM 100 MG/1
100 CAPSULE, LIQUID FILLED ORAL 2 TIMES DAILY
Status: DISCONTINUED | OUTPATIENT
Start: 2024-11-07 | End: 2024-11-08 | Stop reason: HOSPADM

## 2024-11-07 RX ORDER — DEXAMETHASONE SODIUM PHOSPHATE 10 MG/ML
INJECTION, SOLUTION INTRAMUSCULAR; INTRAVENOUS AS NEEDED
Status: DISCONTINUED | OUTPATIENT
Start: 2024-11-07 | End: 2024-11-07

## 2024-11-07 RX ORDER — SENNOSIDES 8.6 MG
1 TABLET ORAL DAILY
Status: DISCONTINUED | OUTPATIENT
Start: 2024-11-08 | End: 2024-11-08 | Stop reason: HOSPADM

## 2024-11-07 RX ORDER — ONDANSETRON 2 MG/ML
4 INJECTION INTRAMUSCULAR; INTRAVENOUS EVERY 6 HOURS PRN
Status: DISCONTINUED | OUTPATIENT
Start: 2024-11-07 | End: 2024-11-08 | Stop reason: HOSPADM

## 2024-11-07 RX ORDER — ACETAMINOPHEN 325 MG/1
650 TABLET ORAL EVERY 6 HOURS SCHEDULED
Status: DISCONTINUED | OUTPATIENT
Start: 2024-11-07 | End: 2024-11-08 | Stop reason: HOSPADM

## 2024-11-07 RX ORDER — OXYBUTYNIN CHLORIDE 5 MG/1
5 TABLET ORAL EVERY 6 HOURS PRN
Status: DISCONTINUED | OUTPATIENT
Start: 2024-11-07 | End: 2024-11-08 | Stop reason: HOSPADM

## 2024-11-07 RX ORDER — SODIUM CHLORIDE, SODIUM LACTATE, POTASSIUM CHLORIDE, CALCIUM CHLORIDE 600; 310; 30; 20 MG/100ML; MG/100ML; MG/100ML; MG/100ML
125 INJECTION, SOLUTION INTRAVENOUS CONTINUOUS
Status: DISPENSED | OUTPATIENT
Start: 2024-11-07 | End: 2024-11-08

## 2024-11-07 RX ORDER — HYDROMORPHONE HCL/PF 1 MG/ML
SYRINGE (ML) INJECTION AS NEEDED
Status: DISCONTINUED | OUTPATIENT
Start: 2024-11-07 | End: 2024-11-07

## 2024-11-07 RX ADMIN — EPHEDRINE SULFATE 5 MG: 50 INJECTION INTRAVENOUS at 13:52

## 2024-11-07 RX ADMIN — HYDROMORPHONE HYDROCHLORIDE 0.5 MG: 1 INJECTION, SOLUTION INTRAMUSCULAR; INTRAVENOUS; SUBCUTANEOUS at 14:24

## 2024-11-07 RX ADMIN — PROPOFOL 50 MG: 10 INJECTION, EMULSION INTRAVENOUS at 14:20

## 2024-11-07 RX ADMIN — DEXAMETHASONE SODIUM PHOSPHATE 10 MG: 10 INJECTION, SOLUTION INTRAMUSCULAR; INTRAVENOUS at 13:47

## 2024-11-07 RX ADMIN — FENTANYL CITRATE 50 MCG: 50 INJECTION INTRAMUSCULAR; INTRAVENOUS at 14:01

## 2024-11-07 RX ADMIN — PROPOFOL 50 MG: 10 INJECTION, EMULSION INTRAVENOUS at 13:43

## 2024-11-07 RX ADMIN — CEFAZOLIN SODIUM 1000 MG: 1 SOLUTION INTRAVENOUS at 13:36

## 2024-11-07 RX ADMIN — FENTANYL CITRATE 25 MCG: 50 INJECTION INTRAMUSCULAR; INTRAVENOUS at 13:55

## 2024-11-07 RX ADMIN — Medication 2.5 MG: at 17:01

## 2024-11-07 RX ADMIN — SODIUM CHLORIDE, SODIUM LACTATE, POTASSIUM CHLORIDE, AND CALCIUM CHLORIDE: .6; .31; .03; .02 INJECTION, SOLUTION INTRAVENOUS at 13:36

## 2024-11-07 RX ADMIN — FENTANYL CITRATE 25 MCG: 50 INJECTION INTRAMUSCULAR; INTRAVENOUS at 13:42

## 2024-11-07 RX ADMIN — EPHEDRINE SULFATE 5 MG: 50 INJECTION INTRAVENOUS at 13:55

## 2024-11-07 RX ADMIN — LIDOCAINE HYDROCHLORIDE 80 MG: 10 INJECTION, SOLUTION EPIDURAL; INFILTRATION; INTRACAUDAL; PERINEURAL at 13:42

## 2024-11-07 RX ADMIN — SODIUM CHLORIDE, SODIUM LACTATE, POTASSIUM CHLORIDE, AND CALCIUM CHLORIDE 100 ML/HR: .6; .31; .03; .02 INJECTION, SOLUTION INTRAVENOUS at 16:14

## 2024-11-07 RX ADMIN — DOCUSATE SODIUM 100 MG: 100 CAPSULE, LIQUID FILLED ORAL at 17:01

## 2024-11-07 RX ADMIN — PROPOFOL 150 MG: 10 INJECTION, EMULSION INTRAVENOUS at 13:42

## 2024-11-07 RX ADMIN — PROPOFOL 100 MG: 10 INJECTION, EMULSION INTRAVENOUS at 13:45

## 2024-11-07 RX ADMIN — ONDANSETRON 4 MG: 2 INJECTION INTRAMUSCULAR; INTRAVENOUS at 13:47

## 2024-11-07 RX ADMIN — ACETAMINOPHEN 650 MG: 325 TABLET, FILM COATED ORAL at 17:01

## 2024-11-07 NOTE — ANESTHESIA PREPROCEDURE EVALUATION
Procedure:  TRANSURETHRAL RESECTION OF PROSTATE (TURP), cystolithalopaxy w holmium laser, removal of foreign body (urolift implants) (Abdomen)  LITHOLAPAXY HOLMIUM LASER (Ureter)    Relevant Problems   CARDIO   (+) Hyperlipidemia   (+) Primary hypertension      /RENAL   (+) Benign prostatic hyperplasia without lower urinary tract symptoms        Physical Exam    Airway    Mallampati score: II  TM Distance: >3 FB  Neck ROM: full     Dental       Cardiovascular      Pulmonary      Other Findings        Anesthesia Plan  ASA Score- 2     Anesthesia Type- general with ASA Monitors.         Additional Monitors:     Airway Plan: LMA.           Plan Factors-Exercise tolerance (METS): >4 METS.    Chart reviewed.        Patient is not a current smoker.  Patient did not smoke on day of surgery.    Obstructive sleep apnea risk education given perioperatively.        Induction- intravenous.    Postoperative Plan- Plan for postoperative opioid use. Planned trial extubation    Perioperative Resuscitation Plan - Level 1 - Full Code.       Informed Consent- Anesthetic plan and risks discussed with patient.  I personally reviewed this patient with the CRNA. Discussed and agreed on the Anesthesia Plan with the CRNA..

## 2024-11-07 NOTE — OP NOTE
Operative Note     PATIENT:  Michael Mckenzie (MRN [id number])    DATE OF PROCEDURE:   11/7/2024    PRE-OP DIAGNOSES:   1) BPH with obstruction  2) bladder calculus, 1 cm     POST-OP DIAGNOSES AND OPERATIVE FINDINGS:   1) BPH with obstruction  2) bladder calculus, 1 cm    PROCEDURES:  1)Transurethral resection of Prostate  2) endoscopy with removal of foreign bodies (UroLift implants)  3.  Cystolitholapaxy for bladder stone less than 1 cm    SURGEON:   Pal Dixon MD    FINDINGS:  -There is a 1 cm calculus noted at the bladder neck, 7 o'clock position.  This is attached to a UroLift implant (urethral end piece), which is deployed just inside of the bladder neck.  The stone is completely addressed using a holmium laser for a cystolitholapaxy  - This implant is then removed with the cystoscope  - Following this a completion TURP is performed.    Specimen(s):  ID Type Source Tests Collected by Time Destination   1 : Urolift implant at baldder neck with bladder stone, removed completely Calculus Urinary Bladder CULTURE, TISSUE AND GRAM STAIN, TISSUE EXAM Pal Dixon MD 11/7/2024 1405    2 : prostate bits- contains metallic implants Tissue Prostate TISSUE EXAM Pal Dixon MD 11/7/2024 1418      No qualified teaching residents available to assist    ANESTHESIA TYPE:  General anesthesia    ESTIMATED BLOOD LOSS:   Minimal    COMPLICATIONS:   None    ANTIBIOTICS:  Cefazolin    INTRAOPERATIVE THROMBOEMBOLISM PROPHYLAXIS:  Pneumatic compression stockings        PROCEDURE SUMMARY:    The patient was brought to the operating room and anesthesia obtained.  The patient was then placed in the lithotomy position and prepped and draped using standard sterile technique.  All pressure points were carefully padded.  A surgical time-out occurred, antibiotics were administered, and thromboembolism prophylaxis was given.  A 27 F saline resectoscope was inserted into the urethra after dilation of the urethral meatus  to 28 F using standard urethral sounds.     Cystoscopy revealed findings as described above.    I began with a cystolitholapaxy of the bladder calculus which was attached to a UroLift implant.  This is addressed with a 1000 µm fiber using high-powered settings and it is fragmented to completion.  Upon complete fragmentation I did visualize the urethral end piece which is positioned just within the bladder neck likely accounting for formation of the stone.  At this point I removed that implant under direct vision using a cystoscope and submitted as a specimen.  All of the stone fragments were removed using an Ellik device and I confirmed that the patient is completely stone free prior to proceeding with a TURP.    Resection was carried down to the floor of the bladder. It was then extended out laterally towards the prostatic capsule which was visualized but not violated. The distal margin of the resection was the vera montanum which was also left intact. Hemostasis was assured using electrocautery. All chips were evacuated from the bladder using an Ellick evacuator, and confirmed under direct vision. Each ureteral orifice was visualized and remained intact at the end of the case. The specimen was submitted for permanent pathology.    A 24 Uzbek three-way Infante catheter was inserted and was connected to bladder irrigation, which was running clear prior to terminating the procedure. A belladonna and opium suppository was administered in the operating room. The patient was awoken transported to PACU in stable condition.      DISPOSITION:   PACU - hemodynamically stable.    SPECIMENS:  Prostate Chips

## 2024-11-07 NOTE — PLAN OF CARE
Problem: PAIN - ADULT  Goal: Verbalizes/displays adequate comfort level or baseline comfort level  Description: Interventions:  - Encourage patient to monitor pain and request assistance  - Assess pain using appropriate pain scale  - Administer analgesics based on type and severity of pain and evaluate response  - Implement non-pharmacological measures as appropriate and evaluate response  - Consider cultural and social influences on pain and pain management  - Notify physician/advanced practitioner if interventions unsuccessful or patient reports new pain  Outcome: Progressing     Problem: INFECTION - ADULT  Goal: Absence or prevention of progression during hospitalization  Description: INTERVENTIONS:  - Assess and monitor for signs and symptoms of infection  - Monitor lab/diagnostic results  - Monitor all insertion sites, i.e. indwelling lines, tubes, and drains  - Monitor endotracheal if appropriate and nasal secretions for changes in amount and color  - [place] appropriate cooling/warming therapies per order  - Administer medications as ordered  - Instruct and encourage patient and family to use good hand hygiene technique  - Identify and instruct in appropriate isolation precautions for identified infection/condition  Outcome: Progressing     Problem: SAFETY ADULT  Goal: Patient will remain free of falls  Description: INTERVENTIONS:  - Educate patient/family on patient safety including physical limitations  - Instruct patient to call for assistance with activity   - Consult OT/PT to assist with strengthening/mobility   - Keep Call bell within reach  - Keep bed low and locked with side rails adjusted as appropriate  - Keep care items and personal belongings within reach  - Initiate and maintain comfort rounds  - Make Fall Risk Sign visible to staff  - Apply yellow socks and bracelet for high fall risk patients  - Consider moving patient to room near nurses station  Outcome: Progressing  Goal: Maintain or  return to baseline ADL function  Description: INTERVENTIONS:  -  Assess patient's ability to carry out ADLs; assess patient's baseline for ADL function and identify physical deficits which impact ability to perform ADLs (bathing, care of mouth/teeth, toileting, grooming, dressing, etc.)  - Assess/evaluate cause of self-care deficits   - Assess range of motion  - Assess patient's mobility; develop plan if impaired  - Assess patient's need for assistive devices and provide as appropriate  - Encourage maximum independence but intervene and supervise when necessary  - Involve family in performance of ADLs  - Assess for home care needs following discharge   - Consider OT consult to assist with ADL evaluation and planning for discharge  - Provide patient education as appropriate  Outcome: Progressing  Goal: Maintains/Returns to pre admission functional level  Description: INTERVENTIONS:  - Perform AM-PAC 6 Click Basic Mobility/ Daily Activity assessment daily.  - Set and communicate daily mobility goal to care team and patient/family/caregiver.   - Collaborate with rehabilitation services on mobility goals if consulted  - Out of bed for toileting  - Record patient progress and toleration of activity level   Outcome: Progressing     Problem: DISCHARGE PLANNING  Goal: Discharge to home or other facility with appropriate resources  Description: INTERVENTIONS:  - Identify barriers to discharge w/patient and caregiver  - Arrange for needed discharge resources and transportation as appropriate  - Identify discharge learning needs (meds, wound care, etc.)  - Arrange for interpretive services to assist at discharge as needed  - Refer to Case Management Department for coordinating discharge planning if the patient needs post-hospital services based on physician/advanced practitioner order or complex needs related to functional status, cognitive ability, or social support system  Outcome: Progressing     Problem: Knowledge  Deficit  Goal: Patient/family/caregiver demonstrates understanding of disease process, treatment plan, medications, and discharge instructions  Description: Complete learning assessment and assess knowledge base.  Interventions:  - Provide teaching at level of understanding  - Provide teaching via preferred learning methods  Outcome: Progressing

## 2024-11-07 NOTE — H&P
UROLOGY HISTORY AND PHYSICAL     Patient Identifiers: Michael Mckenzie (MRN [id number])      Date of Service: 2024        ASSESSMENT:     74 y.o. old male with BPH and a UroLift performed at an outside institution.  He has developed an acquired bladder calculus and significant prostatic regrowth and is highly symptomatic.      PLAN:       He presents today for a TURP and cystolitholapaxy.        History of Present Illness:     Michael Mckenzie is a 74 y.o. old with a history of BPH, bladder stone    Past Medical, Past Surgical History:     Past Medical History:   Diagnosis Date    BPH (benign prostatic hyperplasia)     Colon polyp     Enlarged prostate     HL (hearing loss)     Hypertension     Spontaneous pneumothorax     When 21 years old   :    Past Surgical History:   Procedure Laterality Date    COLONOSCOPY      LAMINECTOMY      TONSILLECTOMY      VASECTOMY      per patient, [id number]   :    Medications, Allergies:     Current Facility-Administered Medications:     ceFAZolin (ANCEF) IVPB (premix in dextrose) 1,000 mg 50 mL, 1,000 mg, Intravenous, Once, Pal Dixon MD    Allergies:  No Known Allergies:    Social and Family History:   Social History:   Social History     Tobacco Use    Smoking status: Former     Current packs/day: 0.00     Average packs/day: 1 pack/day for 15.0 years (15.0 ttl pk-yrs)     Types: Cigarettes     Start date: 1968     Quit date: 1982     Years since quittin.8     Passive exposure: Past    Smokeless tobacco: Never    Tobacco comments:     1ppd age [age], then [age], then quit   Vaping Use    Vaping status: Never Used   Substance Use Topics    Alcohol use: Yes     Alcohol/week: 2.0 standard drinks of alcohol     Types: 2 Glasses of wine per week    Drug use: No   .    Social History     Tobacco Use   Smoking Status Former    Current packs/day: 0.00    Average packs/day: 1 pack/day for 15.0 years (15.0 ttl pk-yrs)    Types: Cigarettes    Start date: 1968     Quit date: 1982    Years since quittin.8    Passive exposure: Past   Smokeless Tobacco Never   Tobacco Comments    1ppd age [age], then [age], then quit       Family History:  Family History   Problem Relation Age of Onset    Prostate cancer Father     Hypertension Father     Cancer Father         erminal prostate cancer   :     Review of Systems:     General: Fever, chills, or night sweats: negative  Cardiac: Negative for chest pain.    Pulmonary: Negative for shortness of breath.  Gastrointestinal: Abdominal pain negative  Nausea, vomiting, or diarrhea negative  Genitourinary: See HPI above.  Patient does nothave hematuria.  All other systems queried were negative.    Physical Exam:   General: Patient is pleasant and in NAD. Awake and alert  BP 96/66   Pulse (!) 50   Temp (!) 97.3 °F (36.3 °C) (Temporal)   Resp 18   SpO2 98%   HEENT:  Normocephalic atraumatic  Cardiac:  Regular rate and rhythm, Peripheral edema: negative  Pulmonary: Non-labored breathing, CTAB  Abdomen: Soft, non-tender, non-distended.  No surgical scars.  No masses, tenderness, hernias noted.    Genitourinary: negative CVA tenderness, neg suprapubic tenderness.  Extremities: normal movement in all 4       Labs:     Lab Results   Component Value Date    HGB 12.9 10/23/2024    HCT 40.3 10/23/2024    WBC 5.29 10/23/2024     10/23/2024   ]    Lab Results   Component Value Date    K 4.1 10/23/2024     (H) 10/23/2024    CO2 24 10/23/2024    BUN 27 (H) 10/23/2024    CREATININE 0.94 10/23/2024    CALCIUM 8.6 10/23/2024   ]    Imaging:   I personally reviewed the images and report of the following studies, and reviewed them with the patient:        Thank you for allowing me to participate in this patients’ care.  Please do not hesitate to call with any additional questions.  Pal Dixon MD

## 2024-11-07 NOTE — ANESTHESIA POSTPROCEDURE EVALUATION
Post-Op Assessment Note    CV Status:  Stable  Pain Score: 0    Pain management: adequate       Mental Status:  Alert and awake   Hydration Status:  Stable   PONV Controlled:  None   Airway Patency:  Patent     Post Op Vitals Reviewed: Yes    No anethesia notable event occurred.    Staff: CRNA           Last Filed PACU Vitals:  Vitals Value Taken Time   Temp 96.9    Pulse 78 11/07/24 1500   /70    Resp 14    SpO2 100 % 11/07/24 1500   Vitals shown include unfiled device data.    Modified Apollo:  No data recorded

## 2024-11-08 VITALS
HEART RATE: 79 BPM | SYSTOLIC BLOOD PRESSURE: 120 MMHG | OXYGEN SATURATION: 94 % | RESPIRATION RATE: 16 BRPM | TEMPERATURE: 98.1 F | DIASTOLIC BLOOD PRESSURE: 72 MMHG

## 2024-11-08 LAB
ANION GAP SERPL CALCULATED.3IONS-SCNC: 6 MMOL/L (ref 4–13)
BUN SERPL-MCNC: 24 MG/DL (ref 5–25)
CALCIUM SERPL-MCNC: 8.4 MG/DL (ref 8.4–10.2)
CHLORIDE SERPL-SCNC: 110 MMOL/L (ref 96–108)
CO2 SERPL-SCNC: 20 MMOL/L (ref 21–32)
CREAT SERPL-MCNC: 0.82 MG/DL (ref 0.6–1.3)
ERYTHROCYTE [DISTWIDTH] IN BLOOD BY AUTOMATED COUNT: 12.5 % (ref 11.6–15.1)
GFR SERPL CREATININE-BSD FRML MDRD: 87 ML/MIN/1.73SQ M
GLUCOSE P FAST SERPL-MCNC: 185 MG/DL (ref 65–99)
GLUCOSE SERPL-MCNC: 185 MG/DL (ref 65–140)
HCT VFR BLD AUTO: 36.6 % (ref 36.5–49.3)
HGB BLD-MCNC: 12.2 G/DL (ref 12–17)
MCH RBC QN AUTO: 31.1 PG (ref 26.8–34.3)
MCHC RBC AUTO-ENTMCNC: 33.3 G/DL (ref 31.4–37.4)
MCV RBC AUTO: 93 FL (ref 82–98)
PLATELET # BLD AUTO: 224 THOUSANDS/UL (ref 149–390)
PMV BLD AUTO: 10 FL (ref 8.9–12.7)
POTASSIUM SERPL-SCNC: 4.4 MMOL/L (ref 3.5–5.3)
RBC # BLD AUTO: 3.92 MILLION/UL (ref 3.88–5.62)
SODIUM SERPL-SCNC: 136 MMOL/L (ref 135–147)
WBC # BLD AUTO: 11.98 THOUSAND/UL (ref 4.31–10.16)

## 2024-11-08 PROCEDURE — 80048 BASIC METABOLIC PNL TOTAL CA: CPT | Performed by: UROLOGY

## 2024-11-08 PROCEDURE — 85027 COMPLETE CBC AUTOMATED: CPT | Performed by: UROLOGY

## 2024-11-08 PROCEDURE — NC001 PR NO CHARGE

## 2024-11-08 PROCEDURE — 99024 POSTOP FOLLOW-UP VISIT: CPT

## 2024-11-08 RX ADMIN — SODIUM CHLORIDE, SODIUM LACTATE, POTASSIUM CHLORIDE, AND CALCIUM CHLORIDE 125 ML/HR: .6; .31; .03; .02 INJECTION, SOLUTION INTRAVENOUS at 08:44

## 2024-11-08 RX ADMIN — ACETAMINOPHEN 650 MG: 325 TABLET, FILM COATED ORAL at 11:46

## 2024-11-08 RX ADMIN — DOCUSATE SODIUM 100 MG: 100 CAPSULE, LIQUID FILLED ORAL at 08:42

## 2024-11-08 RX ADMIN — OXYBUTYNIN CHLORIDE 5 MG: 5 TABLET ORAL at 08:42

## 2024-11-08 RX ADMIN — ENOXAPARIN SODIUM 40 MG: 40 INJECTION SUBCUTANEOUS at 08:42

## 2024-11-08 RX ADMIN — SENNOSIDES 8.6 MG: 8.6 TABLET, FILM COATED ORAL at 08:42

## 2024-11-08 RX ADMIN — SODIUM CHLORIDE, SODIUM LACTATE, POTASSIUM CHLORIDE, AND CALCIUM CHLORIDE 125 ML/HR: .6; .31; .03; .02 INJECTION, SOLUTION INTRAVENOUS at 00:21

## 2024-11-08 RX ADMIN — ACETAMINOPHEN 650 MG: 325 TABLET, FILM COATED ORAL at 07:38

## 2024-11-08 NOTE — PROGRESS NOTES
Progress Note - Urology   Name: Michael Mckenzie 74 y.o. male I MRN: [id number]  Unit/Bed#: S -01 I Date of Admission: 11/7/2024   Date of Service: 11/8/2024 I Hospital Day: 0     Assessment & Plan  Benign prostatic hyperplasia without lower urinary tract symptoms  POD1 TURP, UroLift implant removal, cystolitholapaxy by Dr. Dixon  -Vital signs stable, afebrile  -Mild leukocytosis 11 reactive  -Hemoglobin 12.2 stable  -CBI clamped this a.m., urine assessed in the afternoon blush-colored, no clots  -Tolerating diet, no nausea or vomiting  -Stable for discharge          Subjective:   HPI: Seen at bedside, feeling well.  Mild discomfort around Infante catheter otherwise no abdominal or flank pain.  Denies any complaints.  His CBI was clamped in the morning, remains blush-colored.  No clots    Review of Systems   Constitutional:  Negative for chills and fever.   Respiratory:  Negative for cough and shortness of breath.    Cardiovascular:  Negative for chest pain and palpitations.   Gastrointestinal:  Negative for abdominal pain and vomiting.   Genitourinary:  Positive for hematuria. Negative for dysuria.   Musculoskeletal:  Negative for arthralgias and back pain.   Skin:  Negative for color change and rash.   All other systems reviewed and are negative.      Objective:    Vitals: Blood pressure 120/72, pulse 79, temperature 98.1 °F (36.7 °C), resp. rate 16, SpO2 94%.,There is no height or weight on file to calculate BMI.    Physical Exam  Constitutional:       General: He is not in acute distress.     Appearance: Normal appearance. He is normal weight. He is not ill-appearing or toxic-appearing.   HENT:      Head: Normocephalic and atraumatic.      Right Ear: External ear normal.      Left Ear: External ear normal.      Nose: Nose normal.      Mouth/Throat:      Mouth: Mucous membranes are moist.   Eyes:      General: No scleral icterus.     Conjunctiva/sclera: Conjunctivae normal.   Cardiovascular:      Rate and  Rhythm: Normal rate.      Pulses: Normal pulses.   Pulmonary:      Effort: Pulmonary effort is normal.   Genitourinary:     Comments: Infante catheter draining blush colored urine  Neurological:      General: No focal deficit present.      Mental Status: He is alert and oriented to person, place, and time. Mental status is at baseline.   Psychiatric:         Mood and Affect: Mood normal.         Behavior: Behavior normal.         Thought Content: Thought content normal.         Judgment: Judgment normal.             Labs:  Recent Labs     24  0537   WBC 11.98*       Recent Labs     24  0537   HGB 12.2     Recent Labs     24  0537   HCT 36.6     Recent Labs     24  2333 24  0537   CREATININE 0.95 0.82         History:    Past Medical History:   Diagnosis Date    BPH (benign prostatic hyperplasia)     Colon polyp     Enlarged prostate     HL (hearing loss)     Hypertension     Spontaneous pneumothorax     When 21 years old     Social History     Socioeconomic History    Marital status: Single     Spouse name: None    Number of children: None    Years of education: None    Highest education level: None   Occupational History    None   Tobacco Use    Smoking status: Former     Current packs/day: 0.00     Average packs/day: 1 pack/day for 15.0 years (15.0 ttl pk-yrs)     Types: Cigarettes     Start date: 1968     Quit date: 1982     Years since quittin.8     Passive exposure: Past    Smokeless tobacco: Never    Tobacco comments:     1ppd age [age], then [age], then quit   Vaping Use    Vaping status: Never Used   Substance and Sexual Activity    Alcohol use: Yes     Alcohol/week: 2.0 standard drinks of alcohol     Types: 2 Glasses of wine per week    Drug use: No    Sexual activity: Yes     Partners: Female     Birth control/protection: None   Other Topics Concern    None   Social History Narrative    None     Social Determinants of Health     Financial Resource Strain: Low  Risk  (11/27/2023)    Overall Financial Resource Strain (CARDIA)     Difficulty of Paying Living Expenses: Not hard at all   Food Insecurity: Not on file   Transportation Needs: No Transportation Needs (11/27/2023)    PRAPARE - Transportation     Lack of Transportation (Medical): No     Lack of Transportation (Non-Medical): No   Physical Activity: Not on file   Stress: Not on file   Social Connections: Not on file   Intimate Partner Violence: Not on file   Housing Stability: Not on file     Past Surgical History:   Procedure Laterality Date    COLONOSCOPY      LAMINECTOMY      NY LITHOLAPAXY SMPL/SM <2.5 CM N/A 11/7/2024    Procedure: LITHOLAPAXY HOLMIUM LASER;  Surgeon: Pal Dixon MD;  Location: [facility];  Service: Urology    NY TRURL ELECTROSURG RESCJ PROSTATE BLEED COMPLETE N/A 11/7/2024    Procedure: TRANSURETHRAL RESECTION OF PROSTATE (TURP), cystolithalopaxy w holmium laser, removal of foreign body (urolift implants);  Surgeon: Pal Dixon MD;  Location[facility];  Service: Urology    TONSILLECTOMY      VASECTOMY      per patient, [phone]     Family History   Problem Relation Age of Onset    Prostate cancer Father     Hypertension Father     Cancer Father         erminal prostate cancer       Ratna Whitney PA-C  Date: 11/8/2024 Time: 12:08 PM

## 2024-11-08 NOTE — ASSESSMENT & PLAN NOTE
POD1 TURP, UroLift implant removal, cystolitholapaxy by Dr. Dixon  -Vital signs stable, afebrile  -Mild leukocytosis 11 reactive  -Hemoglobin 12.2 stable  -CBI clamped this a.m., urine assessed in the afternoon blush-colored, no clots  -Tolerating diet, no nausea or vomiting  -Stable for discharge

## 2024-11-08 NOTE — DISCHARGE SUMMARY
Discharge Summary - Urology   Name: Michael Mckenzie 74 y.o. male I MRN: [id number]  Unit/Bed#: S -01 I Date of Admission: 11/7/2024   Date of Service: 11/8/2024 I Hospital Day: 0    Discharge Summary - Michael Mckenzie 74 y.o. male MRN: [id number]    Unit/Bed#: [facility] 433-01 Encounter: [id number]    Admission Date: 11/7/2024     Discharge Date: 11/08/24    HPI: Michael Mckenzie is a 74 y.o. male who presented for TURP, cystolitholopaxy, removal of UroLift implants     Procedure(s):  TRANSURETHRAL RESECTION OF PROSTATE (TURP), cystolithalopaxy w holmium laser, removal of foreign body (urolift implants)  LITHOLAPAXY HOLMIUM LASER  Surgeon(s):  Pal Dixon MD  11/7/2024    Hospital Course: Patient presented for TURP, cystolitholopaxy by Dr. Dixon.  The operation is planned.  See op note for details.  He was admitted to urology service postoperatively for CBI overnight.  His labs are stable in the morning.  He is tolerating diet, no nausea or vomiting.  Ambulating without difficulty.  Mild Infante discomfort.  CBI was clamped and urine remains blush-colored, no clots.  Patient is stable for discharge on the afternoon of 11/8/2024    Discharge Diagnosis: Benign prostatic hyperplasia without lower urinary tract symptoms    Condition at Discharge: good    Discharge Medications:  See after visit summary for reconciled discharge medications provided to patient and family.  Patient was discharged home on home medications with the addition of Keflex, naproxen, Colace, senna Tylenol.     Discharge instructions/Information to patient and family:   See after visit summary for written and verbal information which has been provided to patient and family.      Provisions for Follow-Up Care:  See after visit summary for information related to follow-up care and any pertinent home health orders.      Disposition: Home    Planned Readmission: No    Discharge Statement   I spent 10 minutes discharging the patient. This  time was spent on the day of discharge. I had direct contact with the patient on the day of discharge. Additional documentation is required if more than 30 minutes were spent on discharge.     Signature:   Ratna Whitney PA-C  Date: 11/8/2024 Time: 12:10 PM

## 2024-11-11 ENCOUNTER — PROCEDURE VISIT (OUTPATIENT)
Dept: UROLOGY | Facility: CLINIC | Age: 74
End: 2024-11-11
Payer: MEDICARE

## 2024-11-11 VITALS — HEART RATE: 70 BPM | OXYGEN SATURATION: 98 % | DIASTOLIC BLOOD PRESSURE: 78 MMHG | SYSTOLIC BLOOD PRESSURE: 110 MMHG

## 2024-11-11 DIAGNOSIS — R33.9 URINARY RETENTION: Primary | ICD-10-CM

## 2024-11-11 LAB — POST-VOID RESIDUAL VOLUME, ML POC: 123 ML

## 2024-11-11 PROCEDURE — 51798 US URINE CAPACITY MEASURE: CPT

## 2024-11-11 PROCEDURE — 99024 POSTOP FOLLOW-UP VISIT: CPT

## 2024-11-11 NOTE — PROGRESS NOTES
11/11/2024    Michael OR[facility]  1950  [id number]    Diagnosis      Patient presents for shrestha removal/tov managed by Dr. Dixon    Plan  Patient will return as scheduled     Procedure Shrestha removal/voiding trial    Shrestha catheter removed after deflation of an intact balloon. Patient tolerated well. Encouraged patient to hydrate well and return this afternoon for post void residual.  he knows he may return early if uncomfortable and unable to urinate. Patient agrees to this plan.          Vitals:    11/11/24 0724   BP: 110/78   BP Location: Left arm   Patient Position: Sitting   Cuff Size: Adult   Pulse: 70   SpO2: 98%           Anay Harmon RN

## 2024-11-11 NOTE — PROGRESS NOTES
11/11/2024    Michael ORR [facility]  1950  [id number]    Diagnosis      Patient presents for TOV managed by our office    Plan  Plan to follow up with [facility] as scheduled     Procedure Infante removal/voiding trial      Patient returned this afternoon. Patient states able to void. Patient voided 0 ml while in office. Bladder ultrasound performed and PVR measured 123ml.    Recent Results (from the past 4 hour(s))   POCT Measure PVR    Collection Time: 11/11/24  1:00 PM   Result Value Ref Range    POST-VOID RESIDUAL VOLUME, ML  mL           Vitals:    11/11/24 0724   BP: 110/78   BP Location: Left arm   Patient Position: Sitting   Cuff Size: Adult   Pulse: 70   SpO2: 98%           Preethi Mary RN

## 2024-11-11 NOTE — ANESTHESIA POSTPROCEDURE EVALUATION
Post-Op Assessment Note    CV Status:  Stable  Pain Score: 0    Pain management: adequate       Mental Status:  Alert and awake   Hydration Status:  Stable   PONV Controlled:  None   Airway Patency:  Patent     Post Op Vitals Reviewed: Yes    No anethesia notable event occurred.    Staff: CRNA       Modified Apollo:  No data recorded  Post-Op Assessment Note            No anethesia notable event occurred.    Staff: Anesthesiologist     Reason for prolonged intubation > 24 hours:  Extubated in ORReason for prolonged intubation > 48 hours:  Extubated in OR      Last Filed PACU Vitals:  Vitals Value Taken Time   Temp 97 °F (36.1 °C) 11/07/24 1545   Pulse 58 11/07/24 1551   /77 11/07/24 1545   Resp 13 11/07/24 1551   SpO2 97 % 11/07/24 1551   Vitals shown include unfiled device data.    Modified Apollo:  No data recorded

## 2024-11-12 PROCEDURE — 88342 IMHCHEM/IMCYTCHM 1ST ANTB: CPT | Performed by: PATHOLOGY

## 2024-11-12 PROCEDURE — 88341 IMHCHEM/IMCYTCHM EA ADD ANTB: CPT | Performed by: PATHOLOGY

## 2024-11-12 PROCEDURE — 88305 TISSUE EXAM BY PATHOLOGIST: CPT | Performed by: PATHOLOGY

## 2024-11-24 ENCOUNTER — PATIENT MESSAGE (OUTPATIENT)
Dept: UROLOGY | Facility: CLINIC | Age: 74
End: 2024-11-24

## 2024-12-02 ENCOUNTER — APPOINTMENT (OUTPATIENT)
Dept: LAB | Facility: CLINIC | Age: 74
End: 2024-12-02
Payer: MEDICARE

## 2024-12-02 LAB
EST. AVERAGE GLUCOSE BLD GHB EST-MCNC: 114 MG/DL
HBA1C MFR BLD: 5.6 %

## 2024-12-02 PROCEDURE — 83036 HEMOGLOBIN GLYCOSYLATED A1C: CPT

## 2024-12-03 ENCOUNTER — APPOINTMENT (OUTPATIENT)
Dept: LAB | Facility: CLINIC | Age: 74
End: 2024-12-03
Payer: MEDICARE

## 2024-12-03 DIAGNOSIS — R73.03 DIABETES MELLITUS, LATENT: ICD-10-CM

## 2024-12-03 DIAGNOSIS — E78.5 HYPERLIPIDEMIA, UNSPECIFIED HYPERLIPIDEMIA TYPE: ICD-10-CM

## 2024-12-03 LAB
CHOLEST SERPL-MCNC: 169 MG/DL (ref ?–200)
HDLC SERPL-MCNC: 33 MG/DL
LDLC SERPL CALC-MCNC: 121 MG/DL (ref 0–100)
TRIGL SERPL-MCNC: 74 MG/DL (ref ?–150)

## 2024-12-03 PROCEDURE — 36415 COLL VENOUS BLD VENIPUNCTURE: CPT

## 2024-12-03 PROCEDURE — 80061 LIPID PANEL: CPT

## 2024-12-03 NOTE — ASSESSMENT & PLAN NOTE
Vaccinations: UTD, due for Td/tda  Advance care planning:has POA in chart, POA is wife Rema  Colon cancer screen:colonoscopy 1/2024, repeat 5y.   AAA screen: done, no aneurysm  Healthy diet and regular exercise

## 2024-12-04 ENCOUNTER — OFFICE VISIT (OUTPATIENT)
Dept: INTERNAL MEDICINE CLINIC | Facility: CLINIC | Age: 74
End: 2024-12-04
Payer: MEDICARE

## 2024-12-04 VITALS
OXYGEN SATURATION: 97 % | DIASTOLIC BLOOD PRESSURE: 64 MMHG | WEIGHT: 175.2 LBS | BODY MASS INDEX: 26.64 KG/M2 | SYSTOLIC BLOOD PRESSURE: 118 MMHG | HEART RATE: 71 BPM

## 2024-12-04 DIAGNOSIS — Z00.00 MEDICARE ANNUAL WELLNESS VISIT, SUBSEQUENT: Primary | ICD-10-CM

## 2024-12-04 DIAGNOSIS — M25.561 CHRONIC PAIN OF RIGHT KNEE: ICD-10-CM

## 2024-12-04 DIAGNOSIS — H93.8X2 CONGESTION OF LEFT EAR: ICD-10-CM

## 2024-12-04 DIAGNOSIS — G89.29 CHRONIC PAIN OF RIGHT KNEE: ICD-10-CM

## 2024-12-04 DIAGNOSIS — I10 PRIMARY HYPERTENSION: ICD-10-CM

## 2024-12-04 DIAGNOSIS — H61.21 IMPACTED CERUMEN OF RIGHT EAR: ICD-10-CM

## 2024-12-04 DIAGNOSIS — E78.5 HYPERLIPIDEMIA, UNSPECIFIED HYPERLIPIDEMIA TYPE: ICD-10-CM

## 2024-12-04 PROCEDURE — 99214 OFFICE O/P EST MOD 30 MIN: CPT | Performed by: GENERAL ACUTE CARE HOSPITAL

## 2024-12-04 PROCEDURE — G0439 PPPS, SUBSEQ VISIT: HCPCS | Performed by: GENERAL ACUTE CARE HOSPITAL

## 2024-12-04 NOTE — PROGRESS NOTES
Name: Michael Mckenzie      : 1950      MRN: [id number]  Encounter Provider: Annmarie Bowden MD  Encounter Date: 2024   Encounter department: [facility]    Assessment & Plan  Medicare annual wellness visit, subsequent  Vaccinations: UTD, due for Td/tda  Advance care planning:has POA in chart, POA is wife Rema  Colon cancer screen:colonoscopy 2024, repeat 5y.   AAA screen: done, no aneurysm  Healthy diet and regular exercise            Hyperlipidemia, unspecified hyperlipidemia type  LDL elevated  Discussed statin (ascvd >20%) vs. Lifestyle change  Pt would like to work on his diet  Repeast 6mon    Orders:    Lipid Panel With Direct LDL; Future    Congestion of left ear  Exam unremarkable       Chronic pain of right knee  This is new for months  Likely OA  Mild  Worse when walking upstairs  OA likely  Monitor for now, discussed LE exercise and nonwt bearing cardio exercise like swimming biking       Impacted cerumen of right ear  Schedule with [facility] for clearing       Primary hypertension  Well controled  Continue current med            Preventive health issues were discussed with patient, and age appropriate screening tests were ordered as noted in patient's After Visit Summary. Personalized health advice and appropriate referrals for health education or preventive services given if needed, as noted in patient's After Visit Summary.    History of Present Illness     HPI   Patient Care Team:  Annmarie Bowden MD as PCP - General (Internal Medicine)    Review of Systems  Medical History Reviewed by provider this encounter:       Annual Wellness Visit Questionnaire   Michael is here for his Subsequent Wellness visit.     Health Risk Assessment:   Patient rates overall health as good. Patient feels that their physical health rating is slightly better. Patient is satisfied with their life. Eyesight was rated as same. Hearing was rated as same. Patient feels that their emotional and mental  health rating is same. Patients states they are sometimes angry. Patient states they are sometimes unusually tired/fatigued. Pain experienced in the last 7 days has been none. Patient states that he has experienced no weight loss or gain in last 6 months.     Depression Screening:   PHQ-2 Score: 0      Fall Risk Screening:   In the past year, patient has experienced: no history of falling in past year      Home Safety:  Patient does not have trouble with stairs inside or outside of their home. Patient has working smoke alarms and has working carbon monoxide detector. Home safety hazards include: none.     Nutrition:   Current diet is Regular.     Medications:   Patient is not currently taking any over-the-counter supplements. Patient is able to manage medications.     Activities of Daily Living (ADLs)/Instrumental Activities of Daily Living (IADLs):   Walk and transfer into and out of bed and chair?: Yes  Dress and groom yourself?: Yes    Bathe or shower yourself?: Yes    Feed yourself? Yes  Do your laundry/housekeeping?: Yes  Manage your money, pay your bills and track your expenses?: Yes  Make your own meals?: Yes    Do your own shopping?: Yes    Previous Hospitalizations:   Any hospitalizations or ED visits within the last 12 months?: Yes    How many hospitalizations have you had in the last year?: 1-2    Hospitalization Comments: Prostate TURP surgery 11/7/24    Advance Care Planning:   Living will: Yes    Durable POA for healthcare: Yes    Advanced directive: Yes      PREVENTIVE SCREENINGS      Cardiovascular Screening:    General: Screening Not Indicated and History Lipid Disorder      Diabetes Screening:     General: Screening Current      Colorectal Cancer Screening:     General: Screening Current      Prostate Cancer Screening:    General: Screening Current      Abdominal Aortic Aneurysm (AAA) Screening:    Risk factors include: age between 65-74 yo and tobacco use        Lung Cancer Screening:     General:  Screening Not Indicated    Screening, Brief Intervention, and Referral to Treatment (SBIRT)    Screening  Typical number of drinks in a day: 0  Typical number of drinks in a week: 1  Interpretation: Low risk drinking behavior.    AUDIT-C Screenin) How often did you have a drink containing alcohol in the past year? 2 to 4 times a month  2) How many drinks did you have on a typical day when you were drinking in the past year? 1 to 2  3) How often did you have 6 or more drinks on one occasion in the past year? never    AUDIT-C Score: 2  Interpretation: Score 0-3 (male): Negative screen for alcohol misuse    Single Item Drug Screening:  How often have you used an illegal drug (including marijuana) or a prescription medication for non-medical reasons in the past year? never    Single Item Drug Screen Score: 0  Interpretation: Negative screen for possible drug use disorder    Social Drivers of Health     Financial Resource Strain: Low Risk  (2023)    Overall Financial Resource Strain (CARDIA)     Difficulty of Paying Living Expenses: Not hard at all   Food Insecurity: No Food Insecurity (2024)    Hunger Vital Sign     Worried About Running Out of Food in the Last Year: Never true     Ran Out of Food in the Last Year: Never true   Transportation Needs: No Transportation Needs (2024)    PRAPARE - Transportation     Lack of Transportation (Medical): No     Lack of Transportation (Non-Medical): No   Housing Stability: Unknown (2024)    Housing Stability Vital Sign     Unable to Pay for Housing in the Last Year: No     Homeless in the Last Year: No   Utilities: Not At Risk (2024)    [facility] Utilities     Threatened with loss of utilities: No     No results found.    Objective   /64   Pulse 71   Wt 79.5 kg (175 lb 3.2 oz)   SpO2 97%   BMI 26.64 kg/m²     Physical Exam     Detail Level: Detailed Size Of Lesion: 5x3mm mb Size Of Lesion: 1.5x2 mm d/mb Instructions (Optional): Patient states this has been present x years and hasn’t changed Size Of Lesion: 4x4mm tan medium brown center Size Of Lesion: 5x3mm hazy mb

## 2024-12-04 NOTE — PATIENT INSTRUCTIONS
Repeat lipid panel in 6 months.     Medicare Preventive Visit Patient Instructions  Thank you for completing your Welcome to Medicare Visit or Medicare Annual Wellness Visit today. Your next wellness visit will be due in one year (12/5/2025).  The screening/preventive services that you may require over the next 5-10 years are detailed below. Some tests may not apply to you based off risk factors and/or age. Screening tests ordered at today's visit but not completed yet may show as past due. Also, please note that scanned in results may not display below.  Preventive Screenings:  Service Recommendations Previous Testing/Comments   Colorectal Cancer Screening  Colonoscopy    Fecal Occult Blood Test (FOBT)/Fecal Immunochemical Test (FIT)  Fecal DNA/Cologuard Test  Flexible Sigmoidoscopy Age: 45-75 years old   Colonoscopy: every 10 years (May be performed more frequently if at higher risk)  OR  FOBT/FIT: every 1 year  OR  Cologuard: every 3 years  OR  Sigmoidoscopy: every 5 years  Screening may be recommended earlier than age 45 if at higher risk for colorectal cancer. Also, an individualized decision between you and your healthcare provider will decide whether screening between the ages of 76-85 would be appropriate. Colonoscopy: 01/16/2024  FOBT/FIT: Not on file  Cologuard: Not on file  Sigmoidoscopy: Not on file          Prostate Cancer Screening Individualized decision between patient and health care provider in men between ages of 55-69   Medicare will cover every 12 months beginning on the day after your 50th birthday PSA: 1.15 ng/mL           Hepatitis C Screening Once for adults born between 1945 and 1965  More frequently in patients at high risk for Hepatitis C Hep C Antibody: Not on file        Diabetes Screening 1-2 times per year if you're at risk for diabetes or have pre-diabetes Fasting glucose: 185 mg/dL (11/8/2024)  A1C: 5.6 % (12/2/2024)      Cholesterol Screening Once every 5 years if you don't have a  lipid disorder. May order more often based on risk factors. Lipid panel: 12/03/2024         Other Preventive Screenings Covered by Medicare:  Abdominal Aortic Aneurysm (AAA) Screening: covered once if your at risk. You're considered to be at risk if you have a family history of AAA or a male between the age of 65-75 who smoking at least 100 cigarettes in your lifetime.  Lung Cancer Screening: covers low dose CT scan once per year if you meet all of the following conditions: (1) Age 55-77; (2) No signs or symptoms of lung cancer; (3) Current smoker or have quit smoking within the last 15 years; (4) You have a tobacco smoking history of at least 20 pack years (packs per day x number of years you smoked); (5) You get a written order from a healthcare provider.  Glaucoma Screening: covered annually if you're considered high risk: (1) You have diabetes OR (2) Family history of glaucoma OR (3)  aged 50 and older OR (4)  American aged 65 and older  Osteoporosis Screening: covered every 2 years if you meet one of the following conditions: (1) Have a vertebral abnormality; (2) On glucocorticoid therapy for more than 3 months; (3) Have primary hyperparathyroidism; (4) On osteoporosis medications and need to assess response to drug therapy.  HIV Screening: covered annually if you're between the age of 15-65. Also covered annually if you are younger than 15 and older than 65 with risk factors for HIV infection. For pregnant patients, it is covered up to 3 times per pregnancy.    Immunizations:  Immunization Recommendations   Influenza Vaccine Annual influenza vaccination during flu season is recommended for all persons aged >= 6 months who do not have contraindications   Pneumococcal Vaccine   * Pneumococcal conjugate vaccine = PCV13 (Prevnar 13), PCV15 (Vaxneuvance), PCV20 (Prevnar 20)  * Pneumococcal polysaccharide vaccine = PPSV23 (Pneumovax) Adults 19-63 yo with certain risk factors or if 65+ yo  If  never received any pneumonia vaccine: recommend Prevnar 20 (PCV20)  Give PCV20 if previously received 1 dose of PCV13 or PPSV23   Hepatitis B Vaccine 3 dose series if at intermediate or high risk (ex: diabetes, end stage renal disease, liver disease)   Respiratory syncytial virus (RSV) Vaccine - COVERED BY MEDICARE PART D  * RSVPreF3 (Arexvy) CDC recommends that adults 60 years of age and older may receive a single dose of RSV vaccine using shared clinical decision-making (SCDM)   Tetanus (Td) Vaccine - COST NOT COVERED BY MEDICARE PART B Following completion of primary series, a booster dose should be given every 10 years to maintain immunity against tetanus. Td may also be given as tetanus wound prophylaxis.   Tdap Vaccine - COST NOT COVERED BY MEDICARE PART B Recommended at least once for all adults. For pregnant patients, recommended with each pregnancy.   Shingles Vaccine (Shingrix) - COST NOT COVERED BY MEDICARE PART B  2 shot series recommended in those 19 years and older who have or will have weakened immune systems or those 50 years and older     Health Maintenance Due:      Topic Date Due    Hepatitis C Screening  Never done    Colorectal Cancer Screening  01/14/2029     Immunizations Due:  There are no preventive care reminders to display for this patient.  Advance Directives   What are advance directives?  Advance directives are legal documents that state your wishes and plans for medical care. These plans are made ahead of time in case you lose your ability to make decisions for yourself. Advance directives can apply to any medical decision, such as the treatments you want, and if you want to donate organs.   What are the types of advance directives?  There are many types of advance directives, and each state has rules about how to use them. You may choose a combination of any of the following:  Living will:  This is a written record of the treatment you want. You can also choose which treatments you do  not want, which to limit, and which to stop at a certain time. This includes surgery, medicine, IV fluid, and tube feedings.   Durable power of  for healthcare (DPAHC):  This is a written record that states who you want to make healthcare choices for you when you are unable to make them for yourself. This person, called a proxy, is usually a family member or a friend. You may choose more than 1 proxy.  Do not resuscitate (DNR) order:  A DNR order is used in case your heart stops beating or you stop breathing. It is a request not to have certain forms of treatment, such as CPR. A DNR order may be included in other types of advance directives.  Medical directive:  This covers the care that you want if you are in a coma, near death, or unable to make decisions for yourself. You can list the treatments you want for each condition. Treatment may include pain medicine, surgery, blood transfusions, dialysis, IV or tube feedings, and a ventilator (breathing machine).  Values history:  This document has questions about your views, beliefs, and how you feel and think about life. This information can help others choose the care that you would choose.  Why are advance directives important?  An advance directive helps you control your care. Although spoken wishes may be used, it is better to have your wishes written down. Spoken wishes can be misunderstood, or not followed. Treatments may be given even if you do not want them. An advance directive may make it easier for your family to make difficult choices about your care.   Weight Management   Why it is important to manage your weight:  Being overweight increases your risk of health conditions such as heart disease, high blood pressure, type 2 diabetes, and certain types of cancer. It can also increase your risk for osteoarthritis, sleep apnea, and other respiratory problems. Aim for a slow, steady weight loss. Even a small amount of weight loss can lower your risk of  health problems.  How to lose weight safely:  A safe and healthy way to lose weight is to eat fewer calories and get regular exercise. You can lose up about 1 pound a week by decreasing the number of calories you eat by 500 calories each day.   Healthy meal plan for weight management:  A healthy meal plan includes a variety of foods, contains fewer calories, and helps you stay healthy. A healthy meal plan includes the following:  Eat whole-grain foods more often.  A healthy meal plan should contain fiber. Fiber is the part of grains, fruits, and vegetables that is not broken down by your body. Whole-grain foods are healthy and provide extra fiber in your diet. Some examples of whole-grain foods are whole-wheat breads and pastas, oatmeal, brown rice, and bulgur.  Eat a variety of vegetables every day.  Include dark, leafy greens such as spinach, kale, nakia greens, and mustard greens. Eat yellow and orange vegetables such as carrots, sweet potatoes, and winter squash.   Eat a variety of fruits every day.  Choose fresh or canned fruit (canned in its own juice or light syrup) instead of juice. Fruit juice has very little or no fiber.  Eat low-fat dairy foods.  Drink fat-free (skim) milk or 1% milk. Eat fat-free yogurt and low-fat cottage cheese. Try low-fat cheeses such as mozzarella and other reduced-fat cheeses.  Choose meat and other protein foods that are low in fat.  Choose beans or other legumes such as split peas or lentils. Choose fish, skinless poultry (chicken or turkey), or lean cuts of red meat (beef or pork). Before you cook meat or poultry, cut off any visible fat.   Use less fat and oil.  Try baking foods instead of frying them. Add less fat, such as margarine, sour cream, regular salad dressing and mayonnaise to foods. Eat fewer high-fat foods. Some examples of high-fat foods include french fries, doughnuts, ice cream, and cakes.  Eat fewer sweets.  Limit foods and drinks that are high in sugar. This  includes candy, cookies, regular soda, and sweetened drinks.  Exercise:  Exercise at least 30 minutes per day on most days of the week. Some examples of exercise include walking, biking, dancing, and swimming. You can also fit in more physical activity by taking the stairs instead of the elevator or parking farther away from stores. Ask your healthcare provider about the best exercise plan for you.      © Copyright [employer] 2018 Information is for End User's use only and may not be sold, redistributed or otherwise used for commercial purposes. All illustrations and images included in CareNotes® are the copyrighted property of [employer] Inc[employer] or [employer]

## 2024-12-04 NOTE — ASSESSMENT & PLAN NOTE
LDL elevated  Discussed statin (ascvd >20%) vs. Lifestyle change  Pt would like to work on his diet  Repeast 6mon    Orders:    Lipid Panel With Direct LDL; Future

## 2024-12-04 NOTE — ASSESSMENT & PLAN NOTE
This is new for months  Likely OA  Mild  Worse when walking upstairs  OA likely  Monitor for now, discussed LE exercise and nonwt bearing cardio exercise like swimming biking

## 2024-12-12 ENCOUNTER — OFFICE VISIT (OUTPATIENT)
Dept: UROLOGY | Facility: CLINIC | Age: 74
End: 2024-12-12
Payer: MEDICARE

## 2024-12-12 VITALS
HEIGHT: 68 IN | SYSTOLIC BLOOD PRESSURE: 114 MMHG | WEIGHT: 172.6 LBS | OXYGEN SATURATION: 97 % | BODY MASS INDEX: 26.16 KG/M2 | DIASTOLIC BLOOD PRESSURE: 74 MMHG | HEART RATE: 63 BPM

## 2024-12-12 DIAGNOSIS — N40.0 BENIGN PROSTATIC HYPERPLASIA WITHOUT LOWER URINARY TRACT SYMPTOMS: Primary | ICD-10-CM

## 2024-12-12 LAB — POST-VOID RESIDUAL VOLUME, ML POC: 14 ML

## 2024-12-12 PROCEDURE — 51798 US URINE CAPACITY MEASURE: CPT

## 2024-12-12 PROCEDURE — 99024 POSTOP FOLLOW-UP VISIT: CPT

## 2024-12-12 NOTE — PROGRESS NOTES
12/12/2024      No chief complaint on file.        Assessment and Plan    74 y.o. male managed by Dr. Dixon    BPH  -s/p TURP and cystolitholapaxy  -Pathology benign  -Patient states frequency and urgency and stream has improved. Still having a little frequency, urgency, and incontinence. Denies dysuria and hematuria.  -PVR 14 mls today indicates excellent bladder emptying.  -Patient would like to return as scheduled in April for follow-up.        History of Present Illness  Michael Mckenzie is a 74 y.o. male here with hx of BPH presenting today for follow-up. Patient previously has a Urolift performed at an outside institution. More recently, patient had a TURP and cystolitholapaxy with Dr. Dixon in November 2024. Pathology was benign. He is doing well. Has seen improvement in frequency and urgency as well as his stream. Denies dysuria and gross hematuria. PVR is 14 mls today.       Review of Systems   Constitutional:  Negative for activity change, chills, fatigue and fever.   HENT:  Negative for congestion, rhinorrhea and sore throat.    Eyes:  Negative for photophobia, redness and visual disturbance.   Respiratory:  Negative for cough, shortness of breath and wheezing.    Cardiovascular:  Negative for chest pain, palpitations and leg swelling.   Gastrointestinal:  Negative for abdominal pain, diarrhea, nausea and vomiting.   Genitourinary:  Positive for frequency and urgency. Negative for difficulty urinating, dysuria, flank pain and hematuria.   Neurological:  Negative for weakness, light-headedness and headaches.           AUA SYMPTOM SCORE      Flowsheet Row Most Recent Value   AUA SYMPTOM SCORE    How often have you had a sensation of not emptying your bladder completely after you finished urinating? 1 (P)     How often have you had to urinate again less than two hours after you finished urinating? 3 (P)     How often have you found you stopped and started again several times when you urinate? 0 (P)    "  How often have you found it difficult to postpone urination? 4 (P)     How often have you had a weak urinary stream? 1 (P)     How often have you had to push or strain to begin urination? 0 (P)     How many times did you most typically get up to urinate from the time you went to bed at night until the time you got up in the morning? 3 (P)     Quality of Life: If you were to spend the rest of your life with your urinary condition just the way it is now, how would you feel about that? 3 (P)     AUA SYMPTOM SCORE 12 (P)               Vitals  Vitals:    12/12/24 0904   BP: 114/74   BP Location: Left arm   Patient Position: Sitting   Cuff Size: Large   Pulse: 63   SpO2: 97%   Weight: 78.3 kg (172 lb 9.6 oz)   Height: 5' 8\" (1.727 m)       Physical Exam  Constitutional:       Appearance: Normal appearance. He is not toxic-appearing.   HENT:      Head: Normocephalic.      Mouth/Throat:      Pharynx: Oropharynx is clear.   Eyes:      Extraocular Movements: Extraocular movements intact.      Pupils: Pupils are equal, round, and reactive to light.   Pulmonary:      Effort: Pulmonary effort is normal. No respiratory distress.   Musculoskeletal:         General: Normal range of motion.      Cervical back: Normal range of motion.   Neurological:      Mental Status: He is alert and oriented to person, place, and time. Mental status is at baseline.      Gait: Gait normal.   Psychiatric:         Mood and Affect: Mood normal.         Behavior: Behavior normal.         Thought Content: Thought content normal.         Judgment: Judgment normal.           Past History  Past Medical History:   Diagnosis Date    BPH (benign prostatic hyperplasia)     Colon polyp     Enlarged prostate     HL (hearing loss) 2022    Hypertension     Spontaneous pneumothorax     When 21 years old     Social History     Socioeconomic History    Marital status: Single     Spouse name: None    Number of children: None    Years of education: None    Highest " education level: None   Occupational History    None   Tobacco Use    Smoking status: Former     Current packs/day: 0.00     Average packs/day: 1 pack/day for 15.0 years (15.0 ttl pk-yrs)     Types: Cigarettes     Start date: 1968     Quit date: 1982     Years since quittin.9     Passive exposure: Past    Smokeless tobacco: Never    Tobacco comments:     1ppd age [age], then [age], then quit   Vaping Use    Vaping status: Never Used   Substance and Sexual Activity    Alcohol use: Yes     Alcohol/week: 2.0 standard drinks of alcohol     Types: 2 Glasses of wine per week    Drug use: No    Sexual activity: Yes     Partners: Female     Birth control/protection: None   Other Topics Concern    None   Social History Narrative    None     Social Drivers of Health     Financial Resource Strain: Low Risk  (2023)    Overall Financial Resource Strain (CARDIA)     Difficulty of Paying Living Expenses: Not hard at all   Food Insecurity: No Food Insecurity (2024)    Hunger Vital Sign     Worried About Running Out of Food in the Last Year: Never true     Ran Out of Food in the Last Year: Never true   Transportation Needs: No Transportation Needs (2024)    PRAPARE - Transportation     Lack of Transportation (Medical): No     Lack of Transportation (Non-Medical): No   Physical Activity: Not on file   Stress: Not on file   Social Connections: Not on file   Intimate Partner Violence: Not on file   Housing Stability: Unknown (2024)    Housing Stability Vital Sign     Unable to Pay for Housing in the Last Year: No     Number of Times Moved in the Last Year: Not on file     Homeless in the Last Year: No     Social History     Tobacco Use   Smoking Status Former    Current packs/day: 0.00    Average packs/day: 1 pack/day for 15.0 years (15.0 ttl pk-yrs)    Types: Cigarettes    Start date: 1968    Quit date: 1982    Years since quittin.9    Passive exposure: Past   Smokeless Tobacco Never    Tobacco Comments    1ppd age [age], then [age], then quit     Family History   Problem Relation Age of Onset    Prostate cancer Father     Hypertension Father     Cancer Father         erminal prostate cancer       The following portions of the patient's history were reviewed and updated as appropriate: allergies, current medications, past medical history, past social history, past surgical history and problem list.    Results  Recent Results (from the past hour)   POCT Measure PVR    Collection Time: 12/12/24  9:10 AM   Result Value Ref Range    POST-VOID RESIDUAL VOLUME, ML POC 14 mL   ]  Lab Results   Component Value Date    PSA 1.15 05/07/2024    PSA 0.5 03/15/2023     Lab Results   Component Value Date    CALCIUM 8.4 11/08/2024    K 4.4 11/08/2024    CO2 20 (L) 11/08/2024     (H) 11/08/2024    BUN 24 11/08/2024    CREATININE 0.82 11/08/2024     Lab Results   Component Value Date    WBC 11.98 (H) 11/08/2024    HGB 12.2 11/08/2024    HCT 36.6 11/08/2024    MCV 93 11/08/2024     11/08/2024       CH[facility] May

## 2025-01-02 PROBLEM — Z00.00 MEDICARE ANNUAL WELLNESS VISIT, SUBSEQUENT: Status: RESOLVED | Noted: 2023-12-03 | Resolved: 2025-01-02

## 2025-01-09 ENCOUNTER — PATIENT MESSAGE (OUTPATIENT)
Dept: UROLOGY | Facility: CLINIC | Age: 75
End: 2025-01-09

## 2025-01-09 DIAGNOSIS — R39.9 UTI SYMPTOMS: Primary | ICD-10-CM

## 2025-01-10 ENCOUNTER — APPOINTMENT (OUTPATIENT)
Dept: LAB | Facility: CLINIC | Age: 75
End: 2025-01-10
Payer: MEDICARE

## 2025-01-10 DIAGNOSIS — E78.5 HYPERLIPIDEMIA, UNSPECIFIED HYPERLIPIDEMIA TYPE: ICD-10-CM

## 2025-01-10 LAB
BACTERIA UR QL AUTO: ABNORMAL /HPF
BILIRUB UR QL STRIP: NEGATIVE
CLARITY UR: CLEAR
COLOR UR: ABNORMAL
GLUCOSE UR STRIP-MCNC: NEGATIVE MG/DL
HGB UR QL STRIP.AUTO: ABNORMAL
KETONES UR STRIP-MCNC: NEGATIVE MG/DL
LEUKOCYTE ESTERASE UR QL STRIP: ABNORMAL
NITRITE UR QL STRIP: NEGATIVE
NON-SQ EPI CELLS URNS QL MICRO: ABNORMAL /HPF
PH UR STRIP.AUTO: 5.5 [PH]
PROT UR STRIP-MCNC: ABNORMAL MG/DL
RBC #/AREA URNS AUTO: ABNORMAL /HPF
SP GR UR STRIP.AUTO: 1.02 (ref 1–1.03)
UROBILINOGEN UR STRIP-ACNC: <2 MG/DL
WBC #/AREA URNS AUTO: ABNORMAL /HPF

## 2025-01-10 PROCEDURE — 81001 URINALYSIS AUTO W/SCOPE: CPT | Performed by: UROLOGY

## 2025-01-10 PROCEDURE — 87086 URINE CULTURE/COLONY COUNT: CPT | Performed by: UROLOGY

## 2025-01-11 LAB — BACTERIA UR CULT: NORMAL

## 2025-01-15 DIAGNOSIS — R39.9 LOWER URINARY TRACT SYMPTOMS: Primary | ICD-10-CM

## 2025-01-15 RX ORDER — SOLIFENACIN SUCCINATE 10 MG/1
10 TABLET, FILM COATED ORAL DAILY
Qty: 90 TABLET | Refills: 3 | Status: SHIPPED | OUTPATIENT
Start: 2025-01-15

## 2025-02-18 DIAGNOSIS — I10 PRIMARY HYPERTENSION: ICD-10-CM

## 2025-02-19 RX ORDER — LOSARTAN POTASSIUM 100 MG/1
100 TABLET ORAL DAILY
Qty: 90 TABLET | Refills: 1 | Status: SHIPPED | OUTPATIENT
Start: 2025-02-19

## 2025-02-26 ENCOUNTER — PATIENT MESSAGE (OUTPATIENT)
Dept: UROLOGY | Facility: CLINIC | Age: 75
End: 2025-02-26

## 2025-02-26 DIAGNOSIS — N20.0 CALCULUS OF KIDNEY: Primary | ICD-10-CM

## 2025-02-27 ENCOUNTER — APPOINTMENT (OUTPATIENT)
Dept: LAB | Facility: CLINIC | Age: 75
End: 2025-02-27
Payer: MEDICARE

## 2025-02-27 DIAGNOSIS — N20.0 CALCULUS OF KIDNEY: ICD-10-CM

## 2025-02-27 PROCEDURE — 82360 CALCULUS ASSAY QUANT: CPT

## 2025-03-14 LAB
CA H2 PHOS DIHYD MFR STONE: 10 %
CALCIUM OXALATE DIHYDRATE MFR STONE IR: 40 %
COLOR STONE: NORMAL
COMMENT-STONE3: NORMAL
COMPOSITION: NORMAL
HYDROXYAPATITE 24H ENGDIFF UR: 50 %
LABORATORY COMMENT REPORT: NORMAL
PHOTO: NORMAL
SIZE STONE: NORMAL MM
SPEC SOURCE SUBJ: NORMAL
STONE ANALYSIS-IMP: NORMAL
STONE ANALYSIS-IMP: NORMAL
WT STONE: 58 MG

## 2025-04-08 ENCOUNTER — OFFICE VISIT (OUTPATIENT)
Dept: UROLOGY | Facility: CLINIC | Age: 75
End: 2025-04-08
Payer: MEDICARE

## 2025-04-08 VITALS
HEIGHT: 68 IN | SYSTOLIC BLOOD PRESSURE: 120 MMHG | HEART RATE: 50 BPM | WEIGHT: 173 LBS | DIASTOLIC BLOOD PRESSURE: 70 MMHG | OXYGEN SATURATION: 97 % | BODY MASS INDEX: 26.22 KG/M2

## 2025-04-08 DIAGNOSIS — N40.0 BENIGN PROSTATIC HYPERPLASIA WITHOUT LOWER URINARY TRACT SYMPTOMS: Primary | ICD-10-CM

## 2025-04-08 PROCEDURE — 99213 OFFICE O/P EST LOW 20 MIN: CPT | Performed by: PHYSICIAN ASSISTANT

## 2025-04-08 NOTE — PROGRESS NOTES
UROLOGY PROGRESS NOTE   Patient Identifiers: Michael Mckenzie (MRN [id number])  Date of Service: 4/8/2025    Subjective:   74-year-old man history of BPH and TURP.  He was having some urinary frequency and urgency and has been on Vesicare.  Pathology was benign.  His symptoms have improved.    Reason for visit: Post TURP irritative symptoms    Objective:     VITALS:    Vitals:    04/08/25 1109   BP: 120/70   Pulse: (!) 50   SpO2: 97%     AUA SYMPTOM SCORE      Flowsheet Row Most Recent Value   AUA SYMPTOM SCORE    How often have you had a sensation of not emptying your bladder completely after you finished urinating? 0 (P)     How often have you had to urinate again less than two hours after you finished urinating? 1 (P)     How often have you found you stopped and started again several times when you urinate? 0 (P)     How often have you found it difficult to postpone urination? 1 (P)     How often have you had a weak urinary stream? 0 (P)     How often have you had to push or strain to begin urination? 0 (P)     How many times did you most typically get up to urinate from the time you went to bed at night until the time you got up in the morning? 1 (P)     Quality of Life: If you were to spend the rest of your life with your urinary condition just the way it is now, how would you feel about that? 2 (P)     AUA SYMPTOM SCORE 3 (P)                LABS:  Lab Results   Component Value Date    HGB 12.2 11/08/2024    HCT 36.6 11/08/2024    WBC 11.98 (H) 11/08/2024     11/08/2024   ]    Lab Results   Component Value Date    K 4.4 11/08/2024     (H) 11/08/2024    CO2 20 (L) 11/08/2024    BUN 24 11/08/2024    CREATININE 0.82 11/08/2024    CALCIUM 8.4 11/08/2024   ]        INPATIENT MEDS:    Current Outpatient Medications:     Cholecalciferol (Vitamin D) 50 MCG (2000 UT) tablet, Take 2,000 Units by mouth daily, Disp: , Rfl:     losartan (COZAAR) 100 MG tablet, TAKE ONE TABLET BY MOUTH EVERY DAY, Disp: 90  "tablet, Rfl: 1    solifenacin (VESICARE) 10 MG tablet, Take 1 tablet (10 mg total) by mouth daily, Disp: 90 tablet, Rfl: 3      Physical Exam:   /70 (BP Location: Left arm, Patient Position: Sitting, Cuff Size: Large)   Pulse (!) 50   Ht 5' 8\" (1.727 m)   Wt 78.5 kg (173 lb)   SpO2 97%   BMI 26.30 kg/m²   GEN: no acute distress    RESP: breathing comfortably with no accessory muscle use    ABD: soft, non-tender, non-distended   INCISION:    EXT: no significant peripheral edema       RADIOLOGY:   None    Assessment:   #1.  BPH/TURP    Plan:   -I told him he could try coming off the Vesicare  - Otherwise we will follow-up in 1 year for annual exam  -  -          "

## 2025-05-15 ENCOUNTER — PATIENT MESSAGE (OUTPATIENT)
Dept: UROLOGY | Facility: CLINIC | Age: 75
End: 2025-05-15

## 2025-05-16 ENCOUNTER — OFFICE VISIT (OUTPATIENT)
Dept: URGENT CARE | Facility: CLINIC | Age: 75
End: 2025-05-16
Payer: MEDICARE

## 2025-05-16 VITALS
RESPIRATION RATE: 18 BRPM | TEMPERATURE: 97.6 F | WEIGHT: 166 LBS | BODY MASS INDEX: 25.24 KG/M2 | DIASTOLIC BLOOD PRESSURE: 76 MMHG | HEART RATE: 60 BPM | OXYGEN SATURATION: 96 % | SYSTOLIC BLOOD PRESSURE: 112 MMHG

## 2025-05-16 DIAGNOSIS — R10.31 RIGHT LOWER QUADRANT ABDOMINAL PAIN: Primary | ICD-10-CM

## 2025-05-16 DIAGNOSIS — R10.30 INGUINAL PAIN, UNSPECIFIED LATERALITY: Primary | ICD-10-CM

## 2025-05-16 PROCEDURE — 99213 OFFICE O/P EST LOW 20 MIN: CPT

## 2025-05-16 PROCEDURE — G0463 HOSPITAL OUTPT CLINIC VISIT: HCPCS

## 2025-05-16 NOTE — PROGRESS NOTES
"  [facility]        NAME: Michael Mckenzie is a 74 y.o. male  : 1950    MRN: [id number]  DATE: May 16, 2025  TIME: 2:59 PM    Assessment and Plan   Right lower quadrant abdominal pain [R10.31]  1. Right lower quadrant abdominal pain              Patient Instructions       Follow up with PCP in 3-5 days.  Proceed to  ER if symptoms worsen.    If tests have been performed at [facility], our office will contact you with results if changes need to be made to the care plan discussed with you at the visit.  You can review your full results on [facility] MyChart.    Chief Complaint     Chief Complaint   Patient presents with    Abdominal Pain     Started 2 days ago with RLQ pain, taking tylenol and motrin with some relief, describes pain as sharp, 1 episode of vomiting related pain, concerned for new exercise routine         History of Present Illness       74-year-old male presents for right lower quadrant abdominal pain x 3 days.  Patient admits pain developed following a \"heavy duty workout.  \"Took ibuprofen which resolved the pain.  The following day attempted to work out, which worsened the pain.  Did have 1 episode of nonbloody emesis and intermittent nausea since.  Patient took Pepto-Bismol today which essentially resolved the pain.  Denies fevers, chills, diarrhea, constipation, any obvious lumps or bulging in the groin    Abdominal Pain  This is a new problem. The current episode started in the past 7 days. The onset quality is sudden. The problem occurs constantly. The most recent episode lasted 3 days. The problem has been waxing and waning. The pain is located in the RLQ. The pain is at a severity of 4/10. The quality of the pain is aching and sharp. The abdominal pain does not radiate. Associated symptoms include belching, myalgias, nausea and vomiting. Pertinent negatives include no anorexia, arthralgias, constipation, diarrhea, dysuria, fever, flatus, frequency, headaches, hematochezia, " hematuria, melena or weight loss. The pain is aggravated by NSAIDs. The pain is relieved by Nothing.       Review of Systems   Review of Systems   Constitutional:  Negative for chills, fever and weight loss.   Gastrointestinal:  Positive for abdominal pain, nausea and vomiting. Negative for anorexia, constipation, diarrhea, flatus, hematochezia and melena.   Genitourinary:  Negative for dysuria, frequency and hematuria.   Musculoskeletal:  Positive for myalgias. Negative for arthralgias.   Neurological:  Negative for headaches.         Current Medications     Current Medications[1]    Current Allergies     Allergies as of 05/16/2025    (No Known Allergies)            The following portions of the patient's history were reviewed and updated as appropriate: allergies, current medications, past family history, past medical history, past social history, past surgical history and problem list.     Past Medical History:   Diagnosis Date    BPH (benign prostatic hyperplasia)     Colon polyp     Enlarged prostate     HL (hearing loss) 2022    Hypertension     Spontaneous pneumothorax     When 21 years old       Past Surgical History:   Procedure Laterality Date    COLONOSCOPY      LAMINECTOMY      TX LITHOLAPAXY SMPL/SM <2.5 CM N/A 11/7/2024    Procedure: LITHOLAPAXY HOLMIUM LASER;  Surgeon: Pal Dixon MD;  Location: [facility];  Service: Urology    TX TRURL ELECTROSURG RESCJ PROSTATE BLEED COMPLETE N/A 11/7/2024    Procedure: TRANSURETHRAL RESECTION OF PROSTATE (TURP), cystolithalopaxy w holmium laser, removal of foreign body (urolift implants);  Surgeon: Pal Dixon MD;  Location: [facility];  Service: Urology    TONSILLECTOMY      VASECTOMY      per patient, [id number]       Family History   Problem Relation Age of Onset    Prostate cancer Father     Hypertension Father     Cancer Father         erminal prostate cancer    Rheum arthritis Mother          Medications have been verified.        Objective    /76 (BP Location: Right arm, Patient Position: Sitting)   Pulse 60   Temp 97.6 °F (36.4 °C) (Tympanic)   Resp 18   Wt 75.3 kg (166 lb)   SpO2 96%   BMI 25.24 kg/m²   No LMP for male patient.       Physical Exam     Physical Exam  Vitals and nursing note reviewed.   Constitutional:       General: He is not in acute distress.     Appearance: He is not toxic-appearing.   HENT:      Head: Normocephalic and atraumatic.     Eyes:      Conjunctiva/sclera: Conjunctivae normal.       Cardiovascular:      Rate and Rhythm: Normal rate and regular rhythm.   Pulmonary:      Effort: Pulmonary effort is normal.      Breath sounds: Normal breath sounds.   Abdominal:      General: There is no distension.      Palpations: Abdomen is soft.      Tenderness: There is no abdominal tenderness. There is no right CVA tenderness, left CVA tenderness, guarding or rebound.      Hernia: No hernia is present.     Neurological:      Mental Status: He is alert.      Coordination: Coordination normal.      Gait: Gait normal.     Psychiatric:         Behavior: Behavior normal.                        [1]   Current Outpatient Medications:     Cholecalciferol (Vitamin D) 50 MCG (2000 UT) tablet, Take 2,000 Units by mouth daily, Disp: , Rfl:     losartan (COZAAR) 100 MG tablet, TAKE ONE TABLET BY MOUTH EVERY DAY, Disp: 90 tablet, Rfl: 1    solifenacin (VESICARE) 10 MG tablet, Take 1 tablet (10 mg total) by mouth daily, Disp: 90 tablet, Rfl: 3

## 2025-05-19 ENCOUNTER — TELEPHONE (OUTPATIENT)
Dept: UROLOGY | Facility: CLINIC | Age: 75
End: 2025-05-19

## 2025-05-19 NOTE — TELEPHONE ENCOUNTER
Spoke to pt about a sooner fu with [facility] before his appt on 6/2/25. PT stated he is away 5/23--5/26 so I placed him on the waitlist for 5/27--6/2 with Tristan or [facility].

## 2025-05-19 NOTE — PATIENT COMMUNICATION
Pt returning missed call to schedule office visit for discussion with [facility]     Checked Tristan's schedule at both locations and ended up scheduling patient for first available I could find with [facility] on 6/2 at 9 am in [place].    Pt requesting a call back if a sooner appt comes available at [phone]. Pt will be away from May 23-27th

## 2025-05-21 ENCOUNTER — HOSPITAL ENCOUNTER (OUTPATIENT)
Dept: ULTRASOUND IMAGING | Facility: HOSPITAL | Age: 75
Discharge: HOME | End: 2025-05-21
Payer: MEDICARE

## 2025-05-21 DIAGNOSIS — R10.30 INGUINAL PAIN, UNSPECIFIED LATERALITY: ICD-10-CM

## 2025-05-21 PROCEDURE — 76705 ECHO EXAM OF ABDOMEN: CPT

## 2025-05-29 ENCOUNTER — APPOINTMENT (OUTPATIENT)
Dept: LAB | Facility: CLINIC | Age: 75
End: 2025-05-29
Payer: MEDICARE

## 2025-05-29 DIAGNOSIS — N20.0 CALCULUS OF KIDNEY: ICD-10-CM

## 2025-05-29 PROCEDURE — 82360 CALCULUS ASSAY QUANT: CPT

## 2025-06-05 LAB
COLOR STONE: NORMAL
COM MFR STONE: 100 %
SIZE STONE: NORMAL MM
SPEC SOURCE SUBJ: NORMAL
WT STONE: 98 MG

## 2025-07-31 ENCOUNTER — APPOINTMENT (OUTPATIENT)
Dept: LAB | Facility: CLINIC | Age: 75
End: 2025-07-31
Attending: GENERAL ACUTE CARE HOSPITAL
Payer: MEDICARE

## 2025-07-31 DIAGNOSIS — E78.5 HYPERLIPIDEMIA, UNSPECIFIED HYPERLIPIDEMIA TYPE: Primary | ICD-10-CM

## 2025-07-31 LAB
CHOLEST SERPL-MCNC: 165 MG/DL (ref ?–200)
HDLC SERPL-MCNC: 36 MG/DL
LDLC SERPL CALC-MCNC: 115 MG/DL (ref 0–100)
LDLC SERPL DIRECT ASSAY-MCNC: 121 MG/DL (ref 0–100)
NONHDLC SERPL-MCNC: 129 MG/DL
TRIGL SERPL-MCNC: 68 MG/DL (ref ?–150)

## 2025-07-31 PROCEDURE — 36415 COLL VENOUS BLD VENIPUNCTURE: CPT

## 2025-07-31 PROCEDURE — 80061 LIPID PANEL: CPT

## 2025-07-31 PROCEDURE — 83721 ASSAY OF BLOOD LIPOPROTEIN: CPT

## 2025-08-22 DIAGNOSIS — I10 PRIMARY HYPERTENSION: ICD-10-CM

## 2025-08-22 RX ORDER — LOSARTAN POTASSIUM 100 MG/1
100 TABLET ORAL DAILY
Qty: 90 TABLET | Refills: 1 | Status: SHIPPED | OUTPATIENT
Start: 2025-08-22

## (undated) DEVICE — CHLORHEXIDINE 4PCT 4 OZ

## (undated) DEVICE — BASIC SINGLE BASIN 2-LF: Brand: MEDLINE INDUSTRIES, INC.

## (undated) DEVICE — STERILE LUBRICATING JELLY, TUBE: Brand: HR LUBRICATING JELLY

## (undated) DEVICE — HF-RESECTION ELECTRODE PLASMABUTTON BUTTON, 24 FR., 12°-30°, ESG TURIS: Brand: OLYMPUS

## (undated) DEVICE — PACK TUR

## (undated) DEVICE — 3M™ DURAPORE™ SURGICAL TAPE 2 INCHES X 10YARDS (5.0CM X 9.1M) 6ROLLS/CARTON 10CARTONS/CASE 1538-2: Brand: 3M™ DURAPORE™

## (undated) DEVICE — HF-RESECTION ELECTRODE PLASMALOOP LOOP, LARGE, 24 FR., 12°/16°, ESG TURIS: Brand: OLYMPUS

## (undated) DEVICE — GUIDEWIRE STRGHT TIP 0.035 IN  SOLO PLUS

## (undated) DEVICE — CATH URETERAL 5FR X 70 CM FLEX TIP POLYUR BARD

## (undated) DEVICE — CYSTO TUBING TUR Y IRRIGATION

## (undated) DEVICE — TINCTURE OF BENZOIN SKIN PREP SPRAY IS A SKIN PREP SPRAY THAT ENHANCES THE ADHESION OF TAPE/APPLIANCE WHILE PROTECTING SENSITIVE SKIN FROM ADHESIVES AND BODY FLUIDS.: Brand: TINCTURE OF BENZOIN SPRAY 4OZ US

## (undated) DEVICE — UROLOGIC DRAIN BAG: Brand: UNBRANDED

## (undated) DEVICE — GLOVE SRG BIOGEL 7

## (undated) DEVICE — BAG URINE DRAINAGE 4000ML CONTINUOUS IRR

## (undated) DEVICE — PREMIUM DRY TRAY LF: Brand: MEDLINE INDUSTRIES, INC.

## (undated) DEVICE — INVIEW CLEAR LEGGINGS: Brand: CONVERTORS

## (undated) DEVICE — TUBING SUCTION 5MM X 12 FT

## (undated) DEVICE — CATH FOLEY COUDE HEMATURIA 24FR 30ML 3 WAY LUBRICATH

## (undated) DEVICE — SPECIMEN CONTAINER STERILE PEEL PACK

## (undated) DEVICE — CATH FOLEY 12FR 5ML 2 WAY UNCOATED SILICONE

## (undated) DEVICE — FIBER STD QUANTA 1000 MICRON

## (undated) DEVICE — EVACUATOR BLADDER ELLIK DISP STRL